# Patient Record
Sex: MALE | Race: WHITE | Employment: OTHER | URBAN - METROPOLITAN AREA
[De-identification: names, ages, dates, MRNs, and addresses within clinical notes are randomized per-mention and may not be internally consistent; named-entity substitution may affect disease eponyms.]

---

## 2022-11-21 ENCOUNTER — APPOINTMENT (OUTPATIENT)
Dept: GENERAL RADIOLOGY | Age: 77
DRG: 310 | End: 2022-11-21
Attending: EMERGENCY MEDICINE
Payer: MEDICARE

## 2022-11-21 ENCOUNTER — HOSPITAL ENCOUNTER (INPATIENT)
Age: 77
LOS: 1 days | Discharge: ANOTHER HOSPITAL | DRG: 310 | End: 2022-11-22
Attending: EMERGENCY MEDICINE | Admitting: INTERNAL MEDICINE
Payer: MEDICARE

## 2022-11-21 DIAGNOSIS — R55 SYNCOPE, UNSPECIFIED SYNCOPE TYPE: ICD-10-CM

## 2022-11-21 DIAGNOSIS — I45.9 HEART BLOCK: Primary | ICD-10-CM

## 2022-11-21 DIAGNOSIS — R00.1 SYMPTOMATIC BRADYCARDIA: ICD-10-CM

## 2022-11-21 LAB
ALBUMIN SERPL-MCNC: 3.6 G/DL (ref 3.5–5.2)
ALBUMIN/GLOB SERPL: 1.2 {RATIO} (ref 1.1–2.2)
ALP SERPL-CCNC: 100 U/L (ref 40–129)
ALT SERPL-CCNC: 27 U/L (ref 10–50)
ANION GAP SERPL CALC-SCNC: 15 MMOL/L (ref 5–15)
AST SERPL-CCNC: 30 U/L (ref 10–50)
BASOPHILS # BLD: 0 K/UL (ref 0–0.1)
BASOPHILS NFR BLD: 0 % (ref 0–1)
BILIRUB SERPL-MCNC: 0.7 MG/DL (ref 0.2–1)
BUN SERPL-MCNC: 24 MG/DL (ref 8–23)
BUN/CREAT SERPL: 26 (ref 12–20)
CALCIUM SERPL-MCNC: 8.9 MG/DL (ref 8.8–10.2)
CHLORIDE SERPL-SCNC: 97 MMOL/L (ref 98–107)
CO2 SERPL-SCNC: 22 MMOL/L (ref 22–29)
COMMENT, HOLDF: NORMAL
CREAT SERPL-MCNC: 0.93 MG/DL (ref 0.7–1.2)
DIFFERENTIAL METHOD BLD: ABNORMAL
EOSINOPHIL # BLD: 0 K/UL (ref 0–0.4)
EOSINOPHIL NFR BLD: 0 % (ref 0–7)
ERYTHROCYTE [DISTWIDTH] IN BLOOD BY AUTOMATED COUNT: 12.9 % (ref 11.5–14.5)
GLOBULIN SER CALC-MCNC: 3 G/DL (ref 2–4)
GLUCOSE SERPL-MCNC: 125 MG/DL (ref 65–100)
HCT VFR BLD AUTO: 45.8 % (ref 36.6–50.3)
HGB BLD-MCNC: 15.4 G/DL (ref 12.1–17)
IMM GRANULOCYTES # BLD AUTO: 0.1 K/UL (ref 0–0.04)
IMM GRANULOCYTES NFR BLD AUTO: 1 % (ref 0–0.5)
LYMPHOCYTES # BLD: 2 K/UL (ref 0.8–3.5)
LYMPHOCYTES NFR BLD: 17 % (ref 12–49)
MAGNESIUM SERPL-MCNC: 1.8 MG/DL (ref 1.6–2.4)
MCH RBC QN AUTO: 32.6 PG (ref 26–34)
MCHC RBC AUTO-ENTMCNC: 33.6 G/DL (ref 30–36.5)
MCV RBC AUTO: 97 FL (ref 80–99)
MONOCYTES # BLD: 0.8 K/UL (ref 0–1)
MONOCYTES NFR BLD: 7 % (ref 5–13)
NEUTS SEG # BLD: 9.1 K/UL (ref 1.8–8)
NEUTS SEG NFR BLD: 76 % (ref 32–75)
NRBC # BLD: 0 K/UL (ref 0–0.01)
NRBC BLD-RTO: 0 PER 100 WBC
PLATELET # BLD AUTO: 224 K/UL (ref 150–400)
PMV BLD AUTO: 10.3 FL (ref 8.9–12.9)
POTASSIUM SERPL-SCNC: 4.1 MMOL/L (ref 3.5–5.1)
PROT SERPL-MCNC: 6.6 G/DL (ref 6.4–8.3)
RBC # BLD AUTO: 4.72 M/UL (ref 4.1–5.7)
SAMPLES BEING HELD,HOLD: NORMAL
SODIUM SERPL-SCNC: 134 MMOL/L (ref 136–145)
TROPONIN I BLD-MCNC: <0.04 NG/ML (ref 0–0.08)
WBC # BLD AUTO: 12 K/UL (ref 4.1–11.1)

## 2022-11-21 PROCEDURE — 85025 COMPLETE CBC W/AUTO DIFF WBC: CPT

## 2022-11-21 PROCEDURE — 71045 X-RAY EXAM CHEST 1 VIEW: CPT

## 2022-11-21 PROCEDURE — 84443 ASSAY THYROID STIM HORMONE: CPT

## 2022-11-21 PROCEDURE — 65270000029 HC RM PRIVATE

## 2022-11-21 PROCEDURE — 74011250636 HC RX REV CODE- 250/636: Performed by: EMERGENCY MEDICINE

## 2022-11-21 PROCEDURE — 74011000250 HC RX REV CODE- 250: Performed by: EMERGENCY MEDICINE

## 2022-11-21 PROCEDURE — 36415 COLL VENOUS BLD VENIPUNCTURE: CPT

## 2022-11-21 PROCEDURE — 80053 COMPREHEN METABOLIC PANEL: CPT

## 2022-11-21 PROCEDURE — 99285 EMERGENCY DEPT VISIT HI MDM: CPT

## 2022-11-21 PROCEDURE — 93005 ELECTROCARDIOGRAM TRACING: CPT

## 2022-11-21 PROCEDURE — 83735 ASSAY OF MAGNESIUM: CPT

## 2022-11-21 RX ORDER — ACETAMINOPHEN 650 MG/1
650 SUPPOSITORY RECTAL
Status: DISCONTINUED | OUTPATIENT
Start: 2022-11-21 | End: 2022-11-22 | Stop reason: HOSPADM

## 2022-11-21 RX ORDER — SODIUM CHLORIDE 0.9 % (FLUSH) 0.9 %
5-40 SYRINGE (ML) INJECTION EVERY 8 HOURS
Status: DISCONTINUED | OUTPATIENT
Start: 2022-11-21 | End: 2022-11-22 | Stop reason: HOSPADM

## 2022-11-21 RX ORDER — SODIUM CHLORIDE 0.9 % (FLUSH) 0.9 %
5-40 SYRINGE (ML) INJECTION AS NEEDED
Status: DISCONTINUED | OUTPATIENT
Start: 2022-11-21 | End: 2022-11-22 | Stop reason: HOSPADM

## 2022-11-21 RX ORDER — ONDANSETRON 4 MG/1
4 TABLET, ORALLY DISINTEGRATING ORAL
Status: DISCONTINUED | OUTPATIENT
Start: 2022-11-21 | End: 2022-11-22 | Stop reason: HOSPADM

## 2022-11-21 RX ORDER — ACETAMINOPHEN 325 MG/1
650 TABLET ORAL
Status: DISCONTINUED | OUTPATIENT
Start: 2022-11-21 | End: 2022-11-22 | Stop reason: HOSPADM

## 2022-11-21 RX ORDER — ONDANSETRON 2 MG/ML
4 INJECTION INTRAMUSCULAR; INTRAVENOUS
Status: DISCONTINUED | OUTPATIENT
Start: 2022-11-21 | End: 2022-11-22 | Stop reason: HOSPADM

## 2022-11-21 RX ORDER — ENOXAPARIN SODIUM 100 MG/ML
40 INJECTION SUBCUTANEOUS DAILY
Status: DISCONTINUED | OUTPATIENT
Start: 2022-11-22 | End: 2022-11-22 | Stop reason: HOSPADM

## 2022-11-21 RX ORDER — POLYETHYLENE GLYCOL 3350 17 G/17G
17 POWDER, FOR SOLUTION ORAL DAILY PRN
Status: DISCONTINUED | OUTPATIENT
Start: 2022-11-21 | End: 2022-11-22 | Stop reason: HOSPADM

## 2022-11-21 RX ADMIN — SODIUM CHLORIDE 1000 ML: 9 INJECTION, SOLUTION INTRAVENOUS at 16:58

## 2022-11-21 RX ADMIN — SODIUM CHLORIDE, PRESERVATIVE FREE 10 ML: 5 INJECTION INTRAVENOUS at 16:58

## 2022-11-21 NOTE — ED NOTES
Patient converted from 2nd degree AV block to NSR with HR 74. Dr. William stewart at bedside. EKG repeated.

## 2022-11-21 NOTE — ED TRIAGE NOTES
Pt reports last night he started feeling a little bit lightheaded, says he thought it was due to dehydration and drank a bunch of water. Pt reports this morning he was at the grocery store when he felt some indigestion and then had a syncopal episode with brief LOC of a few seconds. Denies hitting his head. Says he hit his right rib area with his cane when he fell. denies taking any anticoagulants. Takes atenolol. Denies chest pain. Pt noted to be bradycardic in triage.

## 2022-11-21 NOTE — ED NOTES
TRANSFER - OUT REPORT:    Verbal report given to Teressa Petersen RN(name) on Leni Sánchez  being transferred to [facility] ED(unit) for routine progression of care       Report consisted of patients Situation, Background, Assessment and   Recommendations(SBAR). Information from the following report(s) SBAR, Kardex, ED Summary, [facility] and Recent Results was reviewed with the receiving nurse. Lines:   Peripheral IV 11/21/22 Right Antecubital (Active)       Peripheral IV 11/21/22 Left Antecubital (Active)   Site Assessment Clean, dry, & intact 11/21/22 1659   Phlebitis Assessment 0 11/21/22 1659   Infiltration Assessment 0 11/21/22 1659   Dressing Status Clean, dry, & intact 11/21/22 1659   Dressing Type Transparent 11/21/22 1659   Hub Color/Line Status Green 11/21/22 Raoul Woody 1420 for questions and clarification was provided.       Patient transported with:  [facility]

## 2022-11-21 NOTE — Clinical Note
Status[de-identified] INPATIENT [101]   Type of Bed: Intensive Care [6]   Inpatient Hospitalization Certified Necessary for the Following Reasons: 3.  Patient receiving treatment that can only be provided in an inpatient setting (further clarification in H&P documentation)   Admitting Diagnosis: Symptomatic bradycardia [1894848]   Admitting Physician: Deny Felix [[id number]]   Attending Physician: Deny Felix [[id number]]   Estimated Length of Stay: 2 Midnights   Discharge Plan[de-identified] Home with Office Follow-up

## 2022-11-21 NOTE — ED NOTES
Patient intermittently converting between NSR and 2nd degree AV Block. Patient's HR reaching upper 20's intermittently. Patient is alert and oriented x 4, denies chest pain. Dr. Mukul Ward notified of patient's HR. Per Dr. Mukul Ward patient not to be paced unless symptomatic.

## 2022-11-21 NOTE — ED PROVIDER NOTES
History of hypertension, irregular heartbeat. He presents with complaints of a syncopal episode. It occurred prior to arrival.  He states that he was in a grocery store, and the next thing he knew he ended up on the floor. He does not remember getting any warning signs. He states that he aroused before the people arrived to check on him. He has felt fine since then. He had an event last evening where he became lightheaded but did not faint. He has felt well lately. No recent illness. Past Medical History:   Diagnosis Date    Dizzy spells     Irregular heart beats        Past Surgical History:   Procedure Laterality Date    HX ORTHOPAEDIC  1959    Knee work          Family History:   Problem Relation Age of Onset    Stroke Mother        Social History     Socioeconomic History    Marital status: SINGLE     Spouse name: Not on file    Number of children: Not on file    Years of education: Not on file    Highest education level: Not on file   Occupational History    Not on file   Tobacco Use    Smoking status: Former    Smokeless tobacco: Not on file   Substance and Sexual Activity    Alcohol use: No     Alcohol/week: 0.0 standard drinks    Drug use: Not on file    Sexual activity: Not on file   Other Topics Concern    Not on file   Social History Narrative    Not on file     Social Determinants of Health     Financial Resource Strain: Not on file   Food Insecurity: Not on file   Transportation Needs: Not on file   Physical Activity: Not on file   Stress: Not on file   Social Connections: Not on file   Intimate Partner Violence: Not on file   Housing Stability: Not on file         ALLERGIES: Patient has no known allergies. Review of Systems   All other systems reviewed and are negative.     Vitals:    11/21/22 1648 11/21/22 1655   BP: (!) 127/53    Pulse: (!) 44    Resp: 12    Temp: 97.9 °F (36.6 °C)    SpO2: 96% 96%   Weight: 81.6 kg (180 lb)    Height: 6' 1\" (1.854 m)             Physical Exam  Vitals and nursing note reviewed. Constitutional:       Appearance: He is well-developed. HENT:      Head: Normocephalic and atraumatic. Eyes:      Conjunctiva/sclera: Conjunctivae normal.   Neck:      Trachea: No tracheal deviation. Cardiovascular:      Rate and Rhythm: Normal rate and regular rhythm. Heart sounds: Normal heart sounds. No murmur heard. No friction rub. No gallop. Pulmonary:      Effort: Pulmonary effort is normal.      Breath sounds: Normal breath sounds. Abdominal:      Palpations: Abdomen is soft. Tenderness: There is no abdominal tenderness. Musculoskeletal:         General: No deformity. Cervical back: Neck supple. Skin:     General: Skin is warm and dry. Neurological:      Mental Status: He is alert. Comments: oriented        MDM         Procedures    EKG: Normal sinus rhythm; second-degree heart block; rate of 42; normal STSanto MD  4:45 PM    EKG #2: Normal sinus rhythm; rate of 71; normal STSanto MD  5:05 PM    Consult note: I spoke with the cardiology NP. She agrees with transfer to [facility]. Arianna Galloway MD  5:33 PM    [facility] Consult for Admission  5:33 PM    ED Room Number: S72/C79  Patient Name and age:  Ashley Escobedo 65 y.o.  male  Working Diagnosis: 2:1 AV block/syncope    COVID-19 Suspicion:  no  Sepsis present:  no  Reassessment needed: Other  Code Status:  Full Code  Readmission: no  Isolation Requirements:  no  Recommended Level of Care:  ICU  Department: [facility]  Other: Presented after syncopal episode. Found to be in 2-1 AV block. I spoke with cardiology who agrees with the plan for transfer. He is intermittently in sinus rhythm drops down as low as into the 20s. Blood pressures have been stable. Consult note: I reached out to the hospitalist service via [facility] for admission.   Arianna Galloway MD  5:36 PM    Total critical care time (not including time spent performing separately reportable procedures): 35 minutes.   Sandrine Espinoza MD  5:36 PM

## 2022-11-22 ENCOUNTER — HOSPITAL ENCOUNTER (INPATIENT)
Age: 77
LOS: 4 days | Discharge: HOME | DRG: 229 | End: 2022-11-26
Attending: INTERNAL MEDICINE | Admitting: INTERNAL MEDICINE
Payer: MEDICARE

## 2022-11-22 ENCOUNTER — APPOINTMENT (OUTPATIENT)
Dept: NON INVASIVE DIAGNOSTICS | Age: 77
DRG: 310 | End: 2022-11-22
Attending: INTERNAL MEDICINE
Payer: MEDICARE

## 2022-11-22 VITALS
SYSTOLIC BLOOD PRESSURE: 121 MMHG | HEIGHT: 73 IN | BODY MASS INDEX: 23.86 KG/M2 | TEMPERATURE: 98.4 F | OXYGEN SATURATION: 91 % | HEART RATE: 44 BPM | WEIGHT: 180 LBS | RESPIRATION RATE: 13 BRPM | DIASTOLIC BLOOD PRESSURE: 56 MMHG

## 2022-11-22 DIAGNOSIS — I45.9 HEART BLOCK: ICD-10-CM

## 2022-11-22 DIAGNOSIS — I10 PRIMARY HYPERTENSION: Primary | ICD-10-CM

## 2022-11-22 DIAGNOSIS — A80.9 POLIO: ICD-10-CM

## 2022-11-22 DIAGNOSIS — I44.2 COMPLETE AV BLOCK (HCC): ICD-10-CM

## 2022-11-22 DIAGNOSIS — R00.1 SYMPTOMATIC BRADYCARDIA: ICD-10-CM

## 2022-11-22 DIAGNOSIS — R55 SYNCOPE AND COLLAPSE: ICD-10-CM

## 2022-11-22 DIAGNOSIS — Z95.0 S/P PLACEMENT OF LEADLESS CARDIAC PACEMAKER: ICD-10-CM

## 2022-11-22 DIAGNOSIS — I31.4 PERICARDIAL TAMPONADE: ICD-10-CM

## 2022-11-22 LAB
ECHO AO ASC DIAM: 3.1 CM
ECHO AO ASCENDING AORTA INDEX: 1.5 CM/M2
ECHO AR MAX VEL PISA: 2.8 M/S
ECHO AV AREA PEAK VELOCITY: 2.7 CM2
ECHO AV AREA VTI: 2.7 CM2
ECHO AV AREA/BSA PEAK VELOCITY: 1.3 CM2/M2
ECHO AV AREA/BSA VTI: 1.3 CM2/M2
ECHO AV MEAN GRADIENT: 13 MMHG
ECHO AV MEAN VELOCITY: 1.7 M/S
ECHO AV PEAK GRADIENT: 27 MMHG
ECHO AV PEAK VELOCITY: 2.6 M/S
ECHO AV REGURGITANT PHT: 859.9 MILLISECOND
ECHO AV VELOCITY RATIO: 0.85
ECHO AV VTI: 44.9 CM
ECHO LA DIAMETER INDEX: 1.89 CM/M2
ECHO LA DIAMETER: 3.9 CM
ECHO LA VOL 2C: 51 ML (ref 18–58)
ECHO LA VOL 4C: 57 ML (ref 18–58)
ECHO LA VOL BP: 55 ML (ref 18–58)
ECHO LA VOL/BSA BIPLANE: 27 ML/M2 (ref 16–34)
ECHO LA VOLUME AREA LENGTH: 57 ML
ECHO LA VOLUME INDEX A2C: 25 ML/M2 (ref 16–34)
ECHO LA VOLUME INDEX A4C: 28 ML/M2 (ref 16–34)
ECHO LA VOLUME INDEX AREA LENGTH: 28 ML/M2 (ref 16–34)
ECHO LV E' LATERAL VELOCITY: 11 CM/S
ECHO LV E' SEPTAL VELOCITY: 6 CM/S
ECHO LV FRACTIONAL SHORTENING: 45 % (ref 28–44)
ECHO LV INTERNAL DIMENSION DIASTOLE INDEX: 2.57 CM/M2
ECHO LV INTERNAL DIMENSION DIASTOLIC: 5.3 CM (ref 4.2–5.9)
ECHO LV INTERNAL DIMENSION SYSTOLIC INDEX: 1.41 CM/M2
ECHO LV INTERNAL DIMENSION SYSTOLIC: 2.9 CM
ECHO LV IVSD: 1 CM (ref 0.6–1)
ECHO LV MASS 2D: 200.4 G (ref 88–224)
ECHO LV MASS INDEX 2D: 97.3 G/M2 (ref 49–115)
ECHO LV POSTERIOR WALL DIASTOLIC: 1 CM (ref 0.6–1)
ECHO LV RELATIVE WALL THICKNESS RATIO: 0.38
ECHO LVOT AREA: 3.1 CM2
ECHO LVOT AV VTI INDEX: 0.85
ECHO LVOT DIAM: 2 CM
ECHO LVOT MEAN GRADIENT: 9 MMHG
ECHO LVOT PEAK GRADIENT: 20 MMHG
ECHO LVOT PEAK VELOCITY: 2.2 M/S
ECHO LVOT STROKE VOLUME INDEX: 58.2 ML/M2
ECHO LVOT SV: 119.9 ML
ECHO LVOT VTI: 38.2 CM
ECHO MV A VELOCITY: 1.45 M/S
ECHO MV E DECELERATION TIME (DT): 182.4 MS
ECHO MV E VELOCITY: 1.08 M/S
ECHO MV E/A RATIO: 0.74
ECHO MV E/E' LATERAL: 9.82
ECHO MV E/E' RATIO (AVERAGED): 13.91
ECHO MV E/E' SEPTAL: 18
ECHO MV REGURGITANT PEAK GRADIENT: 81 MMHG
ECHO MV REGURGITANT PEAK VELOCITY: 4.5 M/S
ECHO PV MAX VELOCITY: 1.6 M/S
ECHO PV PEAK GRADIENT: 10 MMHG
ECHO RV INTERNAL DIMENSION: 4 CM
ECHO RV TAPSE: 2.9 CM (ref 1.7–?)
ECHO RVOT PEAK GRADIENT: 4 MMHG
ECHO RVOT PEAK VELOCITY: 1 M/S
ECHO TV REGURGITANT MAX VELOCITY: 3.33 M/S
ECHO TV REGURGITANT PEAK GRADIENT: 44 MMHG
TSH SERPL DL<=0.05 MIU/L-ACNC: 2.98 UIU/ML (ref 0.27–4.2)

## 2022-11-22 PROCEDURE — 65620000000 HC RM CCU GENERAL

## 2022-11-22 PROCEDURE — 74011000250 HC RX REV CODE- 250: Performed by: INTERNAL MEDICINE

## 2022-11-22 PROCEDURE — 99223 1ST HOSP IP/OBS HIGH 75: CPT | Performed by: INTERNAL MEDICINE

## 2022-11-22 PROCEDURE — 74011250636 HC RX REV CODE- 250/636: Performed by: INTERNAL MEDICINE

## 2022-11-22 PROCEDURE — 74011000250 HC RX REV CODE- 250: Performed by: EMERGENCY MEDICINE

## 2022-11-22 PROCEDURE — APPSS30 APP SPLIT SHARED TIME 16-30 MINUTES: Performed by: NURSE PRACTITIONER

## 2022-11-22 PROCEDURE — 93306 TTE W/DOPPLER COMPLETE: CPT | Performed by: INTERNAL MEDICINE

## 2022-11-22 PROCEDURE — 93306 TTE W/DOPPLER COMPLETE: CPT

## 2022-11-22 PROCEDURE — 93005 ELECTROCARDIOGRAM TRACING: CPT

## 2022-11-22 RX ORDER — DOPAMINE HYDROCHLORIDE 320 MG/100ML
0-20 INJECTION, SOLUTION INTRAVENOUS
Status: DISCONTINUED | OUTPATIENT
Start: 2022-11-22 | End: 2022-11-22 | Stop reason: HOSPADM

## 2022-11-22 RX ORDER — DOPAMINE HYDROCHLORIDE 320 MG/100ML
0-20 INJECTION, SOLUTION INTRAVENOUS
Status: DISCONTINUED | OUTPATIENT
Start: 2022-11-22 | End: 2022-11-25

## 2022-11-22 RX ORDER — ENOXAPARIN SODIUM 100 MG/ML
40 INJECTION SUBCUTANEOUS DAILY
Status: DISCONTINUED | OUTPATIENT
Start: 2022-11-23 | End: 2022-11-23

## 2022-11-22 RX ORDER — ONDANSETRON 4 MG/1
4 TABLET, ORALLY DISINTEGRATING ORAL
Status: DISCONTINUED | OUTPATIENT
Start: 2022-11-22 | End: 2022-11-26 | Stop reason: HOSPADM

## 2022-11-22 RX ORDER — SODIUM CHLORIDE, SODIUM LACTATE, POTASSIUM CHLORIDE, CALCIUM CHLORIDE 600; 310; 30; 20 MG/100ML; MG/100ML; MG/100ML; MG/100ML
100 INJECTION, SOLUTION INTRAVENOUS CONTINUOUS
Status: DISCONTINUED | OUTPATIENT
Start: 2022-11-23 | End: 2022-11-25

## 2022-11-22 RX ORDER — SODIUM CHLORIDE 0.9 % (FLUSH) 0.9 %
5-40 SYRINGE (ML) INJECTION AS NEEDED
Status: DISCONTINUED | OUTPATIENT
Start: 2022-11-22 | End: 2022-11-26 | Stop reason: HOSPADM

## 2022-11-22 RX ORDER — ATENOLOL 50 MG/1
75 TABLET ORAL DAILY
COMMUNITY

## 2022-11-22 RX ORDER — ACETAMINOPHEN 650 MG/1
650 SUPPOSITORY RECTAL
Status: DISCONTINUED | OUTPATIENT
Start: 2022-11-22 | End: 2022-11-26 | Stop reason: HOSPADM

## 2022-11-22 RX ORDER — ATROPINE SULFATE 0.1 MG/ML
1 INJECTION INTRAVENOUS ONCE
Status: COMPLETED | OUTPATIENT
Start: 2022-11-22 | End: 2022-11-22

## 2022-11-22 RX ORDER — ACETAMINOPHEN 325 MG/1
650 TABLET ORAL
Status: DISCONTINUED | OUTPATIENT
Start: 2022-11-22 | End: 2022-11-26 | Stop reason: HOSPADM

## 2022-11-22 RX ORDER — DOPAMINE HYDROCHLORIDE 320 MG/100ML
INJECTION, SOLUTION INTRAVENOUS
Status: DISCONTINUED
Start: 2022-11-22 | End: 2022-11-22 | Stop reason: HOSPADM

## 2022-11-22 RX ORDER — PROCHLORPERAZINE EDISYLATE 5 MG/ML
10 INJECTION INTRAMUSCULAR; INTRAVENOUS
Status: DISCONTINUED | OUTPATIENT
Start: 2022-11-22 | End: 2022-11-22 | Stop reason: HOSPADM

## 2022-11-22 RX ORDER — POLYETHYLENE GLYCOL 3350 17 G/17G
17 POWDER, FOR SOLUTION ORAL DAILY PRN
Status: DISCONTINUED | OUTPATIENT
Start: 2022-11-22 | End: 2022-11-26 | Stop reason: HOSPADM

## 2022-11-22 RX ORDER — SODIUM CHLORIDE 0.9 % (FLUSH) 0.9 %
5-40 SYRINGE (ML) INJECTION EVERY 8 HOURS
Status: DISCONTINUED | OUTPATIENT
Start: 2022-11-22 | End: 2022-11-26 | Stop reason: HOSPADM

## 2022-11-22 RX ORDER — ONDANSETRON 2 MG/ML
4 INJECTION INTRAMUSCULAR; INTRAVENOUS
Status: DISCONTINUED | OUTPATIENT
Start: 2022-11-22 | End: 2022-11-26 | Stop reason: HOSPADM

## 2022-11-22 RX ADMIN — SODIUM CHLORIDE, PRESERVATIVE FREE 10 ML: 5 INJECTION INTRAVENOUS at 21:12

## 2022-11-22 RX ADMIN — PROCHLORPERAZINE EDISYLATE 10 MG: 5 INJECTION INTRAMUSCULAR; INTRAVENOUS at 04:45

## 2022-11-22 RX ADMIN — ONDANSETRON 4 MG: 2 INJECTION INTRAMUSCULAR; INTRAVENOUS at 03:02

## 2022-11-22 RX ADMIN — DOPAMINE HYDROCHLORIDE IN DEXTROSE 5 MCG/KG/MIN: 3.2 INJECTION, SOLUTION INTRAVENOUS at 02:45

## 2022-11-22 RX ADMIN — DOPAMINE HYDROCHLORIDE IN DEXTROSE 16 MCG/KG/MIN: 3.2 INJECTION, SOLUTION INTRAVENOUS at 15:02

## 2022-11-22 RX ADMIN — ATROPINE SULFATE 1 MG: 0.1 INJECTION, SOLUTION INTRAVENOUS at 02:32

## 2022-11-22 RX ADMIN — SODIUM CHLORIDE, PRESERVATIVE FREE 10 ML: 5 INJECTION INTRAVENOUS at 15:03

## 2022-11-22 RX ADMIN — SODIUM CHLORIDE, PRESERVATIVE FREE 10 ML: 5 INJECTION INTRAVENOUS at 07:30

## 2022-11-22 RX ADMIN — ATROPINE SULFATE 1 MG: 0.1 INJECTION, SOLUTION INTRAVENOUS at 02:25

## 2022-11-22 NOTE — ROUTINE PROCESS
14:00 Emergent admission from [facility] for perm pacer today or tomorrow. Cardiology consulted. Primary Nurse Jermain Palafox, CANDICE and Edel, RN performed a dual skin assessment on this patient No impairment noted  Current Bed: RUBIA Tijerina  Binh score is 17    16:30 Dr. Rosales Magallanes at bedside spoke with patient, states he is on schedule for perm pacemaker tomorrow with Dr. Odette Garcia. NPO after MN    19:30 Bedside shift change report given to Diane Valle (oncoming nurse) by MOM (offgoing nurse). Report included the following information SBAR, Kardex, Procedure Summary, Intake/Output, MAR, Accordion, Recent Results, Med Rec Status, Cardiac Rhythm 3rd degree HB, Alarm Parameters , Pre Procedure Checklist, Procedure Verification, and Quality Measures.

## 2022-11-22 NOTE — PROGRESS NOTES
[id number] called [facility] to get transfer to [facility] started,. Asked to have NAIMA ALSTON to call Dr Concepcion Mcdaniel to discuss transfer. [id number] spoke with [facility], Dr Pascale Corbin in CVICU at [facility] has agreed to accept patient once bed is available.  Requesting that sending cardiologist speak with accepting cardiologist team at [facility]

## 2022-11-22 NOTE — H&P
Hospitalist Admission Note    NAME:  Leni Sánchez   :  1945   MRN:  [id number]     Date/Time:  2022 11:46 PM    Patient PCP: Jose Cochran MD  ________________________________________________________________________    Given the patient's current clinical presentation, I have a high level of concern for decompensation if discharged from the emergency department. Complex decision making was performed, which includes reviewing the patient's available past medical records, laboratory results, and x-ray films. My assessment of this patient's clinical condition and my plan of care is as follows. Assessment / Plan:    Syncope: The patient comes in w/ transient episode of syncope concerning for 2nd degree heart block. VS - HR 65  WBC 12, Hg 15.4  BUN 24, Cr 0.93  EKG w/ 2nd degree AV block    Admit and cont to monitor  Hold Atenolol 75mg daily  Echo, Duplex, orthostatic vitals  Consult cardiology    Update 201  Notified by nursing that the patient lifted his head in bed and began to get dizzy    EKG now reports of complete heart block  Transfer to ICU    I have administered two rounds of 1mg Atropine to assist w/ his bradycardia without any improvement. He is hemodynamically stable and asymptomatic at this time. I have discussed the case w/ Dr. Live Ocampo and he will be transferred to [facility]. 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 Code Status: Full Code   Surrogate Decision Maker  Penasco Balloon (sister)  Ph unk    Prophylaxis:  Lovenox SQ    I have spent 60 minutes in critical care evaluation and treatment of this patient w/ progressing heart block.      Subjective:   CHIEF COMPLAINT: I passed out    HISTORY OF PRESENT ILLNESS:       The patient is a 64 y/o C M w/ PMH paraplysis due to polio who comes in w/ brief syncopal episode which occurred while he was in the grocery store. This only lasted for a few seconds without any known injury. He denies any fever, chills or night sweats. He has no chest pain, palpitations, coughing, wheezing or dyspnea. He has no abdominal pain, nausea, vomiting or diarrhea. Past Medical History:   Diagnosis Date    Dizzy spells     Irregular heart beats       Past Surgical History:   Procedure Laterality Date    HX ORTHOPAEDIC  1959    Knee work      Social History     Tobacco Use    Smoking status: Former    Smokeless tobacco: Not on file   Substance Use Topics    Alcohol use: No     Alcohol/week: 0.0 standard drinks      Family History   Problem Relation Age of Onset    Stroke Mother         No Known Allergies     Prior to Admission medications    Medication Sig Start Date End Date Taking? Authorizing Provider   atenolol (TENORMIN) 50 mg tablet Take 75 mg by mouth daily.     Provider, Historical       REVIEW OF SYSTEMS:  See HPI for details  General: negative for fever, chills, sweats, weakness, weight loss  Eyes: negative for blurred vision, eye pain, loss of vision, diplopia  Ear Nose and Throat: negative for rhinorrhea, pharyngitis, otalgia, tinnitus, speech or swallowing difficulties  Respiratory:  negative for pleuritic pain, cough, sputum production, wheezing, SOB, SANTILLAN  Cardiology:  negative for chest pain, palpitations, orthopnea, PND, edema, syncope   Gastrointestinal: negative for abdominal pain, N/V, dysphagia, change in bowel habits, bleeding  Genitourinary: negative for frequency, urgency, dysuria, hematuria, incontinence  Muskuloskeletal : negative for arthralgia, myalgia  Hematology: negative for easy bruising, bleeding, lymphadenopathy  Dermatological: negative for rash, ulceration, mole change, new lesion  Endocrine: negative for hot flashes or polydipsia  Neurological: negative for headache, dizziness, confusion, focal weakness, paresthesia, memory loss, gait disturbance  Psychological: negative for anxiety, depression, agitation      Objective:   VITALS:    Visit Vitals  BP (!) 141/60   Pulse 65   Temp 97.9 °F (36.6 °C)   Resp 18   Ht 6' 1\" (1.854 m)   Wt 81.6 kg (180 lb)   SpO2 96%   BMI 23.75 kg/m²     PHYSICAL EXAM:    Physical Exam:    Gen: Well-developed, well-nourished, in no acute distress  HEENT:  Pink conjunctivae, PERRL, hearing intact to voice, moist mucous membranes  Neck: Supple, without masses, thyroid non-tender  Resp: No accessory muscle use, clear breath sounds without wheezes rales or rhonchi  Card: No murmurs, normal S1, S2 without thrills, bruits or peripheral edema  Abd:  Soft, non-tender, non-distended, normoactive bowel sounds are present, no palpable organomegaly and no detectable hernias  Lymph:  No cervical or inguinal adenopathy  Musc: No cyanosis or clubbing  Skin: No rashes or ulcers, skin turgor is good  Neuro:  Cranial nerves are grossly intact, no focal motor weakness, follows commands appropriately  Psych:  Good insight, oriented to person, place and time, alert  _______________________________________________________________________  Care Plan discussed with:  Pt's condition, Imaging findings, Lab findings, Assessment, and Care Plan discussed with: Patient  _______________________________________________________________________    Probable disposition:  Home  ________________________________________________________________________    Comments   >50% of visit spent in counseling and coordination of care  Chart reviewed  Discussion with patient and/or family and questions answered     ________________________________________________________________________  Signed: Sherry Johnson MD        Procedures: see electronic medical records for all procedures/Xrays and details which were not copied into this note but were reviewed prior to creation of Plan.     LAB DATA REVIEWED:

## 2022-11-22 NOTE — PROGRESS NOTES
I was asked to comment on patients need for transfer to another facility, specifically their ED. I reviewed documents. Per cardiology note, patient is in need of a pacemaker and said service not available at [facility] until Wednesday - this would introduce a delay in care. As such, they recommended transfer to [facility] where procedure would be done today. Unfortunately there are no ICU beds at the receiving hiospital.    He cannot go from [facility] ICU straight to procedure at [facility] without first being admitted/processed at their facility. As the patient is asymptomatic at this time and his only ICU need is said pacemaker, and as transfer is the quickest way to obtain the recommended treatment, it is reasonable and pragmatic to transfer pt to [facility] ED. The anticipated course would be transfer to ED, from there he can be processed/admitted, receive PPM, and then be downgraded. So long as patient/family amenable with plan, I would proceed with transfer.

## 2022-11-22 NOTE — CONSULTS
[facility] CARDIOLOGY    MINH Addendum    I have seen, interviewed, and examined the patient. I agree with the history, exam, and was involved in the formulation of the assessment and plan as detailed in the Cardiology/Cardiac Electrophysiology consultation documentation composed today by the Advance Practice Provider (MINH, NP, PA). Please see the MINHs note for full details, below includes any additional revisions. I have performed the exam and medical decision making portions in its entirety. Physical exam:  Visit Vitals  BP (!) 147/47   Pulse (!) 47   Temp 98.7 °F (37.1 °C)   Resp 27   Wt 186 lb 8.2 oz (84.6 kg)   SpO2 92%   BMI 24.61 kg/m²       GENERAL: No acute distress  HEAD: AT/NC  HEENT: Sclerae anicteric, ocular muscles intact. CARDIOVASCULAR: bradycardic, nl S1/S2, no murmurs/rubs/gallops  RESPIRATORY: clear to auscultation bilaterally, without wheezes/rales/rhonchi  ABDOMINAL: soft, non-tender, non-distended, positive bowel sounds  EXTREMITIES: warm, well perfused, no lower extremities edema, no cyanosis. NEURO: alert, oriented, answering questions appropriately, no focal neurologic deficits  PSY: normal mood    Data: Labs, ECG, telemetry personally reviewed and interpreted    Current active problems:   1. intermittent complete heart block and 2-1 AV block  2. Syncope  3. Hypertension  4. Post polio syndrome    Assessment and Plan:   Cardiac electrophysiology was consulted regarding the management of syncope and evidence of heart block. Patient presented to [facility] after syncopal episode. He was found to have second-degree type II AV block. I independently reviewed multiple EKGs as well as his rhythm upon arrival to [facility] and he has evidence of intermittent high-grade AV block as well as complete heart block. Currently he is in 2-1 AV block. He is on atenolol 75 mg daily in which his last dose was taken on 11/21/2020 2 in the morning.   However he remains to be bradycardic with 2-1 AV block. Patient remains hemodynamically stable. - TSH within normal limits  - Echocardiogram today demonstrated LVEF of 55 to 60%, moderate aortic sclerosis with mild AI, mild TR  - He remains hemodynamically stable on dopamine drip  - Would recommend continue holding atenolol. However given the lack of improvement in his rhythm, I am concerned that he will likely need pacemaker implantation.  - In the setting of persistent 2-1 heart block and evidence of high-grade AV block and syncope, patient ultimately would benefit from pacemaker implantation  - Spoke to him regarding the implications of dual-chamber pacemaker versus Micra AV leadless pacemaker. Because he has postpolio syndrome and requires significant support with his upper extremities, he is concerned about the potential risks of lead dislodgment with a traditional transvenous pacemaker. He is a candidate for Micra AV leadless pacemaker. We will have Dr. Carolina Mccarthy speak with him in further details should he require permanent pacemaker implantation tomorrow. - We will make him n.p.o. after midnight for possible dual-chamber pacemaker versus leadless pacemaker implantation tomorrow with Dr. Carolina Mccarthy      Thank you so much for the consultation. [facility] EP will continue to follow along. Please don't hesitate to contact us with any questions or referrals.         [x]    High complexity decision making was performed  [x]    Patient is at high-risk of decompensation with multiple organ involvement      ___________________________    An Sanjay Meyers MD, [facility]  Director, Electrophysiology, [facility] and [address] [place], [place] [address] [phone]   C/ Jaswant Finnegan 81  [address]  Kaden Garcia 296                                                                 [facility]                       Electrophysiology Note     [x]Initial Encounter     []Follow-up    Patient Name: Ashley Escobedo - :1945 - CKM:[id number]  Primary Cardiologist: [facility] Cardiology Physicians: Linette Rubio MD  Consulting Cardiologist: [facility] Cardiology Physicians: Vandana Armenta MD     Reason for encounter: syncope; second degree type 2 heart block    HPI:       Ashley Escobedo is a 65 y.o. male with PMH significant for HTN and post-polio syndrome. On  he was in the grocery store shopping when he had a syncopal episode. He was taken to [facility] where he was found to have second degree type 2 heart block. His home atenolol 75 mg daily which he takes for HTN has been held (last dose 22 AM). Labs were unremarkable. Echo today showed EF 55-60%, moderate aortic sclerosis with mild AI, and mild TR. He is currently on a dobutamine gtt. He has been transferred to [facility] for EP services and possible pacemaker. Subjective:      Ashley Escobedo reports syncope yesterday. Prior syncopal episode several years ago. Assessment and Plan     Second degree type 2 heart block. - Likely cause of syncope 22.  - Continue to hold atenolol and all AV raven blocking agents. - Echo 22: EF 55-60%, moderate aortic sclerosis with mild AI, and mild TR.   - Allow beta blocker to wash out.   - Plan for pacemaker Wednesday with Dr. Anai Johansen. Given post-polio syndrome and the necessity of using crutches will consider Micra AV leadless pacemaker as an alternate to traditional pacemaker. NPO after midnight. Hypertension.   - Remain off atenolol. Post-polio syndrome.   - LE weakness. He uses canes to walk.        ____________________________________________________________    Cardiac testing  22    ECHO ADULT COMPLETE 2022    Interpretation Summary    Left Ventricle: Normal left ventricular systolic function with a visually estimated EF of 55 - 60%.  Left ventricle size is normal. Normal wall thickness. Normal wall motion. Grade I diastolic dysfunction with normal LAP. Aortic Valve: Moderately calcified cusp. Moderate sclerosis of the aortic valve cusp. Mild regurgitation. Mild stenosis of the aortic valve. Mitral Valve: Valve structure is normal. Mild annular calcification of the mitral valve. Pulmonary Arteries: Mild pulmonary hypertension present. Signed by: Cecy Fitch MD on 11/22/2022  1:15 PM              Most recent HS troponins:  No results for input(s): TROPHS in the last 72 hours. No lab exists for component:  CKMB    ECG: second degree type 2 heart block    Review of Systems:    [x]All other systems reviewed and all negative except as written in HPI    [] Patient unable to provide secondary to condition    Past Medical History:   Diagnosis Date    Dizzy spells     Irregular heart beats      Past Surgical History:   Procedure Laterality Date    HX ORTHOPAEDIC  1959    Knee work      Social Hx:  reports that he has quit smoking. He does not have any smokeless tobacco history on file. He reports that he does not drink alcohol. Family Hx: family history includes Stroke in his mother. No Known Allergies       OBJECTIVE:  Wt Readings from Last 3 Encounters:   11/22/22 84.6 kg (186 lb 8.2 oz)   11/22/22 81.6 kg (180 lb)   01/08/16 92.1 kg (203 lb)       Intake/Output Summary (Last 24 hours) at 11/22/2022 1502  Last data filed at 11/22/2022 1413  Gross per 24 hour   Intake --   Output 125 ml   Net -125 ml       Physical Exam:    Vitals:   Vitals:    11/22/22 1410 11/22/22 1415 11/22/22 1430 11/22/22 1445   BP:       Pulse:  (!) 42 (!) 43 (!) 46   Resp:  14 16 16   Temp:       SpO2:  91% 92% 93%   Weight: 84.6 kg (186 lb 8.2 oz)        Telemetry: 2:1 AV block    Gen: No acute distress.    Neck: Supple, No JVD, No Carotid Bruit  Resp: No accessory muscle use, Clear breath sounds, No rales or rhonchi  Card: Rate in 50s, Normal S1, S2, 2/6 systolic murmur  Abd:   Soft, non-tender, non-distended, BS+   MSK: Alonzo LE weakness and deformity 2/2 polio. Alonzo UE without abnormalities. Skin: No rashes    Neuro: Alert, oriented. Psych: Good insight, oriented to person, place, alert, Nml Affect  LE: Alonzo trace pretibial edema    Data Review:     Radiology:   XR Results (most recent):  Results from Hospital Encounter encounter on 11/21/22    XR CHEST PORT    Narrative  INDICATION:  Syncope    COMPARISON: None    FINDINGS: Single AP portable view of the chest obtained at 1726 demonstrates an  enlarged cardiac silhouette. The lungs are clear bilaterally. No osseous  abnormalities are seen. Impression  Cardiomegaly. No focal pulmonary process. Recent Labs     11/21/22  1653   *   K 4.1   CL 97*   CO2 22   BUN 24*   CREA 0.93   *   CA 8.9     Recent Labs     11/21/22  1653   WBC 12.0*   HGB 15.4   HCT 45.8        Recent Labs     11/21/22  1653        No results for input(s): CHOL, LDLC in the last 72 hours.     No lab exists for component: TGL, HDLC,  HBA1C      Current meds:    Current Facility-Administered Medications:     sodium chloride (NS) flush 5-40 mL, 5-40 mL, IntraVENous, Q8H, Damion Brown MD    sodium chloride (NS) flush 5-40 mL, 5-40 mL, IntraVENous, PRN, Damion Brown MD    acetaminophen (TYLENOL) tablet 650 mg, 650 mg, Oral, Q6H PRN **OR** acetaminophen (TYLENOL) suppository 650 mg, 650 mg, Rectal, Q6H PRN, Damion Brown MD    polyethylene glycol (MIRALAX) packet 17 g, 17 g, Oral, DAILY PRN, Damion Brown MD    ondansetron (ZOFRAN ODT) tablet 4 mg, 4 mg, Oral, Q8H PRN **OR** ondansetron (ZOFRAN) injection 4 mg, 4 mg, IntraVENous, Q6H PRN, Quinton Kingdom, MD Dennard Gosselin ON 11/23/2022] enoxaparin (LOVENOX) injection 40 mg, 40 mg, SubCUTAneous, DAILY, Grant Stevenson MD    DOPamine (INTROPIN) 800 mg in dextrose 5% 250 mL infusion, 0-20 mcg/kg/min, IntraVENous, TITRATE, Quinton Kingdom, MD Ellender Osler, NP    [facility] Cardiology  Call center: (P) [phone] (f) [phone]      CC: Other, MD Jose

## 2022-11-22 NOTE — Clinical Note
TRANSFER - OUT REPORT:     Verbal report given to: (at bedside). Report consisted of patient's Situation, Background, Assessment and   Recommendations(SBAR). Opportunity for questions and clarification was provided. Patient transported with a Registered Nurse. Patient transported to: CCU.

## 2022-11-22 NOTE — PROGRESS NOTES
Called for bradycardia, lightheadedness. Syncope earlier in day. EKG 2:1 AVB. Currently asymptomatic, but was lightheaded with positional shift. Spoke with Hospitalist, nursing supervisor - advised transfer to [facility] for PPM as no EP physician until Wednesday at [facility]. Hold BB. Echo pending.

## 2022-11-22 NOTE — ED NOTES
Bedside and Verbal shift change report given to [address] [place] [address] (oncoming nurse) by Tonia Ayala (offgoing nurse). Report included the following information Kardex, ED Summary, MAR, and Recent Results.

## 2022-11-22 NOTE — DISCHARGE SUMMARY
Discharge Summary       PATIENT ID: Rigoberto Delgadillo  MRN: [id number]   YOB: 1945    DATE OF ADMISSION: 11/21/2022  4:29 PM    DATE OF DISCHARGE: 11/22/2023  PRIMARY CARE PROVIDER: Jazmine Baig MD       DISCHARGING PHYSICIAN: Celeste Pinto MD    To contact this individual call [phone] and ask the  to page. If unavailable ask to be transferred the Adult Hospitalist Department. 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 This only lasted for a few seconds without any known injury. He denies any fever, chills or night sweats. He has no chest pain, palpitations, coughing, wheezing or dyspnea. He has no abdominal pain, nausea, vomiting or diarrhea. He was foud to be in 2nd degree heart block which converted to complete heart block. He is currently on dopamine gtt and will be transferred to [facility] for PPM.      DISCHARGE DIAGNOSES / PLAN:       As above       PENDING TEST RESULTS:   At the time of discharge the following test results are still pending:     FOLLOW UP APPOINTMENTS:    Follow-up Information       Follow up With Specialties Details Why Contact Jose Lucia MD Neurology                ADDITIONAL CARE RECOMMENDATIONS:     DIET: Regular Diet    ACTIVITY: Activity as tolerated    WOUND CARE:     EQUIPMENT needed:       DISCHARGE MEDICATIONS:  Current Discharge Medication List        STOP taking these medications       atenolol (TENORMIN) 50 mg tablet Comments:   Reason for Stopping:                 NOTIFY YOUR PHYSICIAN FOR ANY OF THE FOLLOWING:   Fever over 101 degrees for 24 hours. Chest pain, shortness of breath, fever, chills, nausea, vomiting, diarrhea, change in mentation, falling, weakness, bleeding.  Severe pain or pain not relieved by medications. Or, any other signs or symptoms that you may have questions about. DISPOSITION:  x  Home With:   OT  PT  HH  RN       Long term SNF/Inpatient Rehab    Independent/assisted living    Hospice    Other:       PATIENT CONDITION AT DISCHARGE:     Functional status    Poor     Deconditioned    x Independent      Cognition   x  Lucid     Forgetful     Dementia      Catheters/lines (plus indication)    Tompkins     PICC     PEG     None      Code status    x Full code     DNR      PHYSICAL EXAMINATION AT DISCHARGE:   Refer to Progress Note    Gen:    Well-developed, well-nourished, in no acute distress  HEENT:  Pink conjunctivae, PERRL, hearing intact to voice, moist mucous membranes  Neck:  Supple, without masses, thyroid non-tender  Resp:  No accessory muscle use, clear breath sounds without wheezes rales or rhonchi  Card:   No murmurs, normal S1, S2 without thrills, bruits or peripheral edema.  bradycardia  Abd:  Soft, non-tender, non-distended, normoactive bowel sounds are present, no palpable organomegaly and no detectable hernias  Lymph:  No cervical or inguinal adenopathy  Musc:  No cyanosis or clubbing  Skin:   No rashes or ulcers, skin turgor is good  Neuro:  Cranial nerves are grossly intact, no focal motor weakness, follows commands appropriately  Psych:  Good insight, oriented to person, place and time, alert    CHRONIC MEDICAL DIAGNOSES:  Problem List as of 11/22/2022 Date Reviewed: 1/8/2016            Codes Class Noted - Resolved    Symptomatic bradycardia ICD-10-CM: R00.1  ICD-9-CM: 427.89  11/21/2022 - Present        Syncope ICD-10-CM: R55  ICD-9-CM: 780.2  11/21/2022 - Present        H/O atrial tachycardia ICD-10-CM: Z86.79  ICD-9-CM: V12.59  10/31/2014 - Present        HTN (hypertension) ICD-10-CM: I10  ICD-9-CM: 401.9  10/17/2014 - Present        Polio ICD-10-CM: A80.9  ICD-9-CM: 045.90  10/17/2014 - Present           Greater than 30 minutes were spent with the patient on counseling and coordination of care    Signed:   Kenyetta Woody MD  11/22/2022  11:34 AM

## 2022-11-22 NOTE — ED NOTES
[facility] called for update and pushed back ETA to 2030. Representative informed me that they received miscommuniation of need for lights and sirens call. ETA remains 2030.

## 2022-11-22 NOTE — PROGRESS NOTES
[facility]                       Cardiology Care Note     [x]Initial Encounter     []Follow-up    Patient Name: Nino Kline - :1945 - BGW:[id number]  Primary Cardiologist: [facility] Cardiology Physicians: Marc Simon MD  Consulting Cardiologist: [facility] Cardiology Physicians: Marc Simon MD     Reason for encounter: heart block     HPI:       Nino Kline is a 65 y.o. male with PMH significant for atrial tach, HTN, polio who presented w/ brief syncopal episode which occurred while he was in the grocery store. This only lasted for a few seconds without any known injury. He denies any fever, chills or night sweats. He has no chest pain, palpitations, coughing, wheezing or dyspnea. He has no abdominal pain, nausea, vomiting or diarrhea. Telemetry notes high grade AV block . Pt placed on dopamine. HR now 72. No syncope this am .     Subjective:      Nino Kline reports near-syncope. Assessment and Plan     1 high grade AV block (CHB Type 2 second degree). Cont dopamine. Plans are for transfer to [facility] for pacemaker insertion. Check TSH, ECHO. Hold AV raven agents. 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 Pt personally seen and examined. Chart reviewed. Agree with advanced NP's history, exam and  A/P with changes/additons. Vital signs reviewed/telemetry reviewed       Alert/Not in acute distress  Neck - no JVD  CVS - S1 S2 +; irregular; No murmur  RS : CTAB; No rales/rhonchi  Abd: Soft/BS+  LE - no edema    A/P:    History of syncope/dizziness/bradycardia/intermittent complete heart block-overnight on-call cardiologist-Dr. Yocasta Roa has discussed with electrophysiologist and patient is scheduled to be transferred to [facility] for permanent pacemaker. Beta-blockers on hold. Discussed with patient. He agrees to the transfer.     Discussed with patient/nursing    [facility] Jimmy Shannon MD, [facility] [place]      ____________________________________________________________    Cardiac testing  ECHO 2014 preserved LV function             Most recent HS troponins:  No results for input(s): TROPHS in the last 72 hours.     No lab exists for component:  CKMB    ECG:   EKG Results       Procedure 720 Value Units Date/Time    EKG, 12 LEAD, INITIAL [[id number]] Collected: 11/22/22 0439    Order Status: Completed Updated: 11/22/22 0439     Ventricular Rate 88 BPM      Atrial Rate 77 BPM      QRS Duration 130 ms      Q-T Interval 466 ms      QTC Calculation (Bezet) 563 ms      Calculated P Axis 31 degrees      Calculated R Axis -17 degrees      Calculated T Axis 124 degrees      Diagnosis --     Critical Test Result: Long QTc , AV Block  Sinus rhythm with AV dissociation and Wide QRS rhythm with frequent premature   ventricular complexes  Left ventricular hypertrophy with QRS widening and repolarization abnormality   ( R in aVL , [place] product )  Inferior infarct , age undetermined  Abnormal ECG  When compared with ECG of 22-NOV-2022 01:51,  MANUAL COMPARISON REQUIRED, DATA IS UNCONFIRMED      EKG, 12 LEAD, INITIAL [[id number]] Collected: 11/21/22 1642    Order Status: Completed Updated: 11/21/22 1643     Ventricular Rate 42 BPM      Atrial Rate 84 BPM      P-R Interval 236 ms      QRS Duration 80 ms      Q-T Interval 462 ms      QTC Calculation (Bezet) 385 ms      Calculated P Axis 27 degrees      Calculated R Axis -2 degrees      Calculated T Axis 35 degrees      Diagnosis --      Critical Test Result: AV Block  Sinus rhythm with 2nd degree AV block with 2:1 AV conduction  Inferior infarct , age undetermined  Abnormal ECG  No previous ECGs available                Review of Systems:    [x]All other systems reviewed and all negative except as written in HPI    [] Patient unable to provide secondary to condition    Past Medical History:   Diagnosis Date    Dizzy spells     Irregular heart beats      Past Surgical History:   Procedure Laterality Date    HX ORTHOPAEDIC  1959    Knee work      Social Hx:  reports that he has quit smoking. He does not have any smokeless tobacco history on file. He reports that he does not drink alcohol. Family Hx: family history includes Stroke in his mother. No Known Allergies       OBJECTIVE:  Wt Readings from Last 3 Encounters:   11/21/22 81.6 kg (180 lb)   01/08/16 92.1 kg (203 lb)   01/05/15 84.8 kg (187 lb)       Intake/Output Summary (Last 24 hours) at 11/22/2022 0806  Last data filed at 11/22/2022 0410  Gross per 24 hour   Intake 20.41 ml   Output --   Net 20.41 ml       Physical Exam:    Vitals:   Vitals:    11/22/22 0400 11/22/22 0500 11/22/22 0600 11/22/22 0700   BP: (!) 121/40 (!) 120/51 (!) 134/45 (!) 130/51   Pulse: 78 (!) 117 90 83   Resp: 14 22 16 22   Temp:       SpO2: 95% 96% 96% 95%   Weight:       Height:         Telemetry:  AV block     Gen: Well-developed, well-nourished, in no acute distress  Neck: Supple, No JVD, No Carotid Bruit  Resp: No accessory muscle use, Clear breath sounds, No rales or rhonchi  Card: Regular Rate,Rythm, Normal S1, S2, No murmurs, rubs or gallop. No thrills. Abd:   Soft, non-tender, non-distended, BS+   MSK: No cyanosis  Skin: No rashes    Neuro: Moving all four extremities, follows commands appropriately  Psych: Good insight, oriented to person, place, alert, Nml Affect  LE: No edema    Data Review:     Radiology:   XR Results (most recent):  Results from Hospital Encounter encounter on 11/21/22    XR CHEST PORT    Narrative  INDICATION:  Syncope    COMPARISON: None    FINDINGS: Single AP portable view of the chest obtained at 1726 demonstrates an  enlarged cardiac silhouette. The lungs are clear bilaterally. No osseous  abnormalities are seen. Impression  Cardiomegaly. No focal pulmonary process.       Recent Labs     11/21/22  1653   *   K 4.1   CL 97*   CO2 22   BUN 24*   CREA 0.93   *   CA 8.9     Recent Labs 11/21/22  1653   WBC 12.0*   HGB 15.4   HCT 45.8        Recent Labs     11/21/22  1653        No results for input(s): CHOL, LDLC in the last 72 hours. No lab exists for component: TGL, HDLC,  HBA1C      Current meds:    Current Facility-Administered Medications:     DOPamine (INTROPIN) 800 mg in dextrose 5% 250 mL infusion, 0-20 mcg/kg/min, IntraVENous, TITRATE, Anju Veloz MD, Last Rate: 23 mL/hr at 11/22/22 0730, 15 mcg/kg/min at 11/22/22 0730    prochlorperazine (COMPAZINE) injection 10 mg, 10 mg, IntraVENous, Q6H PRN, Lucille Mari MD, 10 mg at 11/22/22 0445    sodium chloride (NS) flush 5-40 mL, 5-40 mL, IntraVENous, Q8H, Ramona Hernandez MD, 10 mL at 11/22/22 0730    sodium chloride (NS) flush 5-40 mL, 5-40 mL, IntraVENous, PRN, Ramona Hernandez MD    sodium chloride (NS) flush 5-40 mL, 5-40 mL, IntraVENous, Q8H, Anju Veloz MD, 10 mL at 11/22/22 0730    sodium chloride (NS) flush 5-40 mL, 5-40 mL, IntraVENous, PRN, Lucille Mari MD    acetaminophen (TYLENOL) tablet 650 mg, 650 mg, Oral, Q6H PRN **OR** acetaminophen (TYLENOL) suppository 650 mg, 650 mg, Rectal, Q6H PRN, Lucille Mari MD    polyethylene glycol (MIRALAX) packet 17 g, 17 g, Oral, DAILY PRN, Lucille Mari MD    ondansetron (ZOFRAN ODT) tablet 4 mg, 4 mg, Oral, Q8H PRN **OR** ondansetron (ZOFRAN) injection 4 mg, 4 mg, IntraVENous, Q6H PRN, Lucille Mari MD, 4 mg at 11/22/22 0302    enoxaparin (LOVENOX) injection 40 mg, 40 mg, SubCUTAneous, DAILY, Anju Veloz MD Arlice Salon, GHASSAN    [facility] Cardiology  Call center: (B) [phone]  (A) [phone]      CC: Dimas, MD Jose

## 2022-11-22 NOTE — ED NOTES
This RN to patient's room to prepare patient for transfer to ICU. Patient was resting comfortably with stable vitals in NSR. This RN was advising patient of current plan of care. Patient lifted his head off the bed and he heart rate drop and he had a significant pause in his rhythm and had a some p waves. Patient's HR up to 26-32 with a 3rd degree AV block. ER MD and attending MD notified. EKG obtained, pads placed on pt and on [place]. Attending MD came down to see the patient. Advised to transfer to ICU.  This RN, second primary RN, ICU RN, attending MD transferred patient to ICU bed 8

## 2022-11-22 NOTE — Clinical Note
Left femoral vein. Accessed successfully. Femoral access needle used. Using ultrasound guidance. Number of attempts =  5.

## 2022-11-22 NOTE — PROGRESS NOTES
0945 spoke with Transfer/access center. Dr Joy Daniel and Dr Gee Romano accepting. Awaiting bed assignment. Transfer center/access center to call back once bed is available and assigned and aware critical care ground transport will be the need for transportation.

## 2022-11-22 NOTE — PROGRESS NOTES
0225 Pt arrived from ER in 3rd degree With Dr. Kami Reeves accompanying patient. HR 24 on the monitor. RRT, Nehemias Meraz, at bedside. Per MD pt was given atropine times 2 on arrival see MAR. Cardiology leo. Magdiel spoke with Dr. Elisha Malloy with Cards who requested that patient be transferred to [facility]. Per nursing supervisor, Kaitlyn Gaspar, no beds available at [facility]. Informed Dr. Kami Reeves. Spoke with supervisor, Kaitlyn Gaspar, again. Pt to be transferred to the ED at [facility].  0255 Dobutamine started per telephone order between Dr. Kami Reeves and Dr. Elisha Malloy. 0330 Dr. Kami Reeves called Dr. Placido Loja, hospitalist at [facility] to see if he would accept patient. Per Dr. Placido Loja he is not accepting. 1492 Dr. Kami Reeves called ER at [facility] to see if ED MD would accept the patient. 0600 Since last entry pt remains at [facility]. Intensive MD Vergara pagestephan and spoke with Dr. Sophie Dela Cruz. No order to move patient at this time. Pt in 2nd degree heart block. Remains attached to pacer pads. Code cart at the bedside. Will continue to monitor.

## 2022-11-22 NOTE — PROGRESS NOTES
Reason for Admission:  symptomatic bradycardia,lightheaded with positional changes                     RUR Score:          9%           Plan for utilizing home health:      to be determined by discharge    PCP: First and Last name:  Patient First     Name of Practice:    Are you a current patient: Yes/No: yes   Approximate date of last visit:    Can you participate in a virtual visit with your PCP: no                    Current Advanced Directive/Advance Care Plan: Full Code      Healthcare Decision Maker:   Ebony Dejesus @ [phone]                             Transition of Care Plan:              Problems:  Syncope  Symptomatic bradycardia        Childhood polio     Prior to admission,pt passed out @ the [employer]    Pt lives by himself @ the address on demographics. His sister(s) live in [address] [place] [address]. Pt uses a brace and crutches to ambulate as he had polio as a child. He spent his birthday in a [facility] hospital with polio as a child. Pt stated\"I am a [employer] brat. \"\"We lived all over. \"  Pt is currently on dopamine infusion. The plan is to transfer pt to [facility] once we have an accepting physician and bed assignment. In anticipation of transport,I have completed the physician certification [facility]) for transport.     Kalyan Jacobo

## 2022-11-22 NOTE — PROGRESS NOTES
0700 Bedside and Verbal shift change report given to Jorge A Ghotra RN (oncoming nurse) by Roby Rollins RN (offgoing nurse). Report included the following information SBAR, ED Summary, Intake/Output, MAR, Recent Results, Med Rec Status, and Cardiac Rhythm 3rd degree HB . Primary Nurse Severino Hatch RN and Roby Rollins RN performed a dual skin assessment on this patient No impairment noted  Binh score is see flowsheet. 0730 Patient assessed, see flowsheet. Patient is alert and oriented x4, pupils are round and reactive, patient does not report any weakness, numbness/tingling, or lightheadedness. All extremities have purposeful movement and pulses felt in all extremities. Patient is in 3rd degree heart block, defibrillator pads attached to patient, defibrillator at bedside. Heart sounds heard, lung sounds clear, and bowel sounds active. Patient is on Dopamine at 15 mcg.     0800 Patient received breakfast.      0815 Patient is now NPO, per NP. Breakfast tray removed. Patient ate a few bites of eggs and a few bites of potatoes. NP aware. 0900 Lovenox held per NP.     1122 Dopamine rate changed to 16 mcg.     1200 Patient reassessed, no change, see flowsheet. 1300 TRANSFER - OUT REPORT:    Verbal report given to Mariano Leyva RN (name) on Pat Liter  being transferred to CCU [facility] (unit) for routine progression of care       Report consisted of patients Situation, Background, Assessment and   Recommendations(SBAR). Information from the following report(s) SBAR, ED Summary, Intake/Output, MAR, Recent Results, Med Rec Status, and Cardiac Rhythm 3rd degree heart block  was reviewed with the receiving nurse.     Lines:   Peripheral IV 11/21/22 Right Antecubital (Active)   Site Assessment Clean, dry, & intact 11/22/22 1200   Phlebitis Assessment 0 11/22/22 1200   Infiltration Assessment 0 11/22/22 1200   Dressing Status Clean, dry, & intact 11/22/22 1200   Dressing Type Transparent 11/22/22 1200   Hub Color/Line Status Blue; Infusing;Patent;Capped 11/22/22 1200   Action Taken Open ports on tubing capped 11/22/22 1200   Alcohol Cap Used Yes 11/22/22 1200       Peripheral IV 11/21/22 Left Antecubital (Active)   Site Assessment Clean, dry, & intact 11/22/22 1200   Phlebitis Assessment 0 11/22/22 1200   Infiltration Assessment 0 11/22/22 1200   Dressing Status Clean, dry, & intact 11/22/22 1200   Dressing Type Transparent 11/22/22 1200   Hub Color/Line Status Green 11/22/22 1200   Action Taken Open ports on tubing capped 11/22/22 1200   Alcohol Cap Used Yes 11/22/22 1200        Opportunity for questions and clarification was provided.       Patient transported with:   Monitor  Registered Nurse  Critical Care Transport

## 2022-11-22 NOTE — H&P
CRITICAL CARE NOTE      Name: Radha Woodson   : 1945   MRN: [id number]   Date: 2022      REASON FOR ICU ADMISSION:  2:1 heart block    PRINCIPAL ICU DIAGNOSIS   Mobitz II heart block    BRIEF PATIENT SUMMARY   65 M with post polio syndrome and hypertension presented to [facility]  after syncope episode and found to have Mobitz II heart block. It was noted that he takes atenolol for hypertension. He was monitored overnight. Echocardiogram revealed normal LV function. Heart block persisted into morning of  and he was transferred to [facility] for possible PPM placement    [facility]    Admitted to [facility] after syncope   Transferred to [facility]. 2:1 HB persists.  Asymptomatic while @ rest    COMPREHENSIVE ASSESSMENT & PLAN:SYSTEM BASED     NEUROLOGICAL:   Post polio syndrome  No acute issues  Cognition intact  Monitor    PULMONOLOGY:   No acute issues  Remote minimal smoking history  No prior pulmonary history  Continuous pulse ox  Supplemental O2 as needed    CARDIOVASCULAR:   History of hypertension on atenolol - last dose   2:1 heart block  Cardiology to see  Continue dopamine gtt for now    GASTROINTESTINAL   No acute issues  Reg diet ordered    RENAL/ELECTROLYTE/FLUIDS:   Very mild hyponatremia  Monitor chemistry panels daily  Correct electrolytes as indicated    ENDOCRINE:   Very mild hyperglycemia without prior dx of DM  Monitor glucose on chem panels  Glu goal 100-180  SSI if needed    HEMATOLOGY/ONCOLOGY:   No issues  Follow CBC  Transfuse per usual ICU guidelines  DVTppx: LMWH    INFECTIOUS DISEASE:   No issues    ANTIBIOTICS TO DATE:    CULTURES TO DATE:      ICU DAILY CHECKLIST     Code Status:full  DVT Prophylaxis:enoxaparin  T/L/D: Tubes:   Lines:   Drains:   SUP: N/I  Diet: Reg diet  Activity Level: As tolerated  ABCDEF Bundle/Checklist Completed:Yes  Disposition: Stay in ICU  Multidisciplinary Rounds Completed:  Pending  Goals of Care Discussion/Palliative: N/A  Patient/Family Updated: Yes          SUBJECTIVE   Review of Systems   All other systems reviewed and are negative. OBJECTIVE     Labs and Data: Reviewed 22  Medications: Reviewed 22  Imaging: Reviewed 22    Physical Exam  Vitals reviewed. Constitutional:       General: He is not in acute distress. HENT:      Head: Normocephalic and atraumatic. Eyes:      Extraocular Movements: Extraocular movements intact. Conjunctiva/sclera: Conjunctivae normal.      Pupils: Pupils are equal, round, and reactive to light. Cardiovascular:      Rate and Rhythm: Regular rhythm. Bradycardia present. Pulses: Normal pulses. Heart sounds: Murmur heard. Comments: 2:1 block. II/VI systolic murmur  Pulmonary:      Effort: Pulmonary effort is normal. No respiratory distress. Breath sounds: Normal breath sounds. Abdominal:      General: Abdomen is flat. Bowel sounds are normal.      Palpations: Abdomen is soft. Musculoskeletal:         General: Deformity present. Cervical back: Neck supple. Comments: BLEs atrophic. Trace symmetric pretibial edema   Skin:     General: Skin is warm and dry. Capillary Refill: Capillary refill takes less than 2 seconds. Neurological:      Mental Status: He is alert. Mental status is at baseline. Psychiatric:         Mood and Affect: Mood normal.         Thought Content:  Thought content normal.        Visit Vitals  /62   Pulse (!) 48   Temp 97.9 °F (36.6 °C)   Resp 13   Wt 84.6 kg (186 lb 8.2 oz)   SpO2 93%   BMI 24.61 kg/m²        Temp (24hrs), Av.3 °F (36.8 °C), Min:97.9 °F (36.6 °C), Max:99 °F (37.2 °C)           Intake/Output:     Intake/Output Summary (Last 24 hours) at 2022 1420  Last data filed at 2022 1413  Gross per 24 hour   Intake --   Output 125 ml   Net -125 ml       CXR:  no acute findings      ECHO ADULT COMPLETE 2022    Interpretation Summary    Left Ventricle: Normal left ventricular systolic function with a visually estimated EF of 55 - 60%. Left ventricle size is normal. Normal wall thickness. Normal wall motion. Grade I diastolic dysfunction with normal LAP. Aortic Valve: Moderately calcified cusp. Moderate sclerosis of the aortic valve cusp. Mild regurgitation. Mild stenosis of the aortic valve. Mitral Valve: Valve structure is normal. Mild annular calcification of the mitral valve. Pulmonary Arteries: Mild pulmonary hypertension present. Signed by: Harpreet Braden MD on 11/22/2022  1:15 PM         CRITICAL CARE DOCUMENTATION  I had a face to face encounter with the patient, reviewed and interpreted patient data including clinical events, labs, images, vital signs, I/O's, and examined patient. I have discussed the case and the plan and management of the patient's care with the consulting services, the bedside nurses and the respiratory therapist.      NOTE OF PERSONAL INVOLVEMENT IN CARE   This patient has a high probability of imminent, clinically significant deterioration, which requires the highest level of preparedness to intervene urgently. I participated in the decision-making and personally managed or directed the management of the following life and organ supporting interventions that required my frequent assessment to treat or prevent imminent deterioration. I personally spent -- minutes of critical care time. This is time spent at this critically ill patient's bedside actively involved in patient care as well as the coordination of care. This does not include any procedural time which has been billed separately.     Maria Eugenia Guo MD   Critical Care Medicine  [facility]

## 2022-11-22 NOTE — ED NOTES
Verbal shift change report given to Brittany Rosario (oncoming nurse) by Sandra Yang (offgoing nurse). Report included the following information SBAR, Kardex, ED Summary, [facility] and Recent Results.

## 2022-11-23 ENCOUNTER — ANESTHESIA (OUTPATIENT)
Dept: CARDIAC CATH/INVASIVE PROCEDURES | Age: 77
DRG: 229 | End: 2022-11-23
Payer: MEDICARE

## 2022-11-23 ENCOUNTER — ANESTHESIA EVENT (OUTPATIENT)
Dept: CARDIAC CATH/INVASIVE PROCEDURES | Age: 77
DRG: 229 | End: 2022-11-23
Payer: MEDICARE

## 2022-11-23 LAB
ABO + RH BLD: NORMAL
ACT BLD: 137 SECS (ref 79–138)
ANION GAP SERPL CALC-SCNC: 9 MMOL/L (ref 5–15)
ATRIAL RATE: 77 BPM
ATRIAL RATE: 82 BPM
BLOOD GROUP ANTIBODIES SERPL: NORMAL
BUN SERPL-MCNC: 32 MG/DL (ref 6–20)
BUN/CREAT SERPL: 31 (ref 12–20)
CALCIUM SERPL-MCNC: 8.6 MG/DL (ref 8.5–10.1)
CALCULATED P AXIS, ECG09: 31 DEGREES
CALCULATED P AXIS, ECG09: 52 DEGREES
CALCULATED R AXIS, ECG10: -17 DEGREES
CALCULATED R AXIS, ECG10: 32 DEGREES
CALCULATED T AXIS, ECG11: 124 DEGREES
CALCULATED T AXIS, ECG11: 19 DEGREES
CHLORIDE SERPL-SCNC: 104 MMOL/L (ref 97–108)
CO2 SERPL-SCNC: 24 MMOL/L (ref 21–32)
CREAT SERPL-MCNC: 1.03 MG/DL (ref 0.7–1.3)
DIAGNOSIS, 93000: NORMAL
DIAGNOSIS, 93000: NORMAL
ERYTHROCYTE [DISTWIDTH] IN BLOOD BY AUTOMATED COUNT: 12.8 % (ref 11.5–14.5)
ERYTHROCYTE [DISTWIDTH] IN BLOOD BY AUTOMATED COUNT: 12.8 % (ref 11.5–14.5)
GLUCOSE SERPL-MCNC: 140 MG/DL (ref 65–100)
HCT VFR BLD AUTO: 39.9 % (ref 36.6–50.3)
HCT VFR BLD AUTO: 43.1 % (ref 36.6–50.3)
HGB BLD-MCNC: 13.6 G/DL (ref 12.1–17)
HGB BLD-MCNC: 14.8 G/DL (ref 12.1–17)
MCH RBC QN AUTO: 32.4 PG (ref 26–34)
MCH RBC QN AUTO: 32.6 PG (ref 26–34)
MCHC RBC AUTO-ENTMCNC: 34.1 G/DL (ref 30–36.5)
MCHC RBC AUTO-ENTMCNC: 34.3 G/DL (ref 30–36.5)
MCV RBC AUTO: 94.3 FL (ref 80–99)
MCV RBC AUTO: 95.7 FL (ref 80–99)
NRBC # BLD: 0 K/UL (ref 0–0.01)
NRBC # BLD: 0 K/UL (ref 0–0.01)
NRBC BLD-RTO: 0 PER 100 WBC
NRBC BLD-RTO: 0 PER 100 WBC
PLATELET # BLD AUTO: 185 K/UL (ref 150–400)
PLATELET # BLD AUTO: 196 K/UL (ref 150–400)
PMV BLD AUTO: 10.4 FL (ref 8.9–12.9)
PMV BLD AUTO: 10.8 FL (ref 8.9–12.9)
POTASSIUM SERPL-SCNC: 4.4 MMOL/L (ref 3.5–5.1)
Q-T INTERVAL, ECG07: 466 MS
Q-T INTERVAL, ECG07: 550 MS
QRS DURATION, ECG06: 130 MS
QRS DURATION, ECG06: 132 MS
QTC CALCULATION (BEZET), ECG08: 388 MS
QTC CALCULATION (BEZET), ECG08: 563 MS
RBC # BLD AUTO: 4.17 M/UL (ref 4.1–5.7)
RBC # BLD AUTO: 4.57 M/UL (ref 4.1–5.7)
SODIUM SERPL-SCNC: 137 MMOL/L (ref 136–145)
SPECIMEN EXP DATE BLD: NORMAL
VENTRICULAR RATE, ECG03: 30 BPM
VENTRICULAR RATE, ECG03: 88 BPM
WBC # BLD AUTO: 16.8 K/UL (ref 4.1–11.1)
WBC # BLD AUTO: 17.7 K/UL (ref 4.1–11.1)

## 2022-11-23 PROCEDURE — 74011250636 HC RX REV CODE- 250/636: Performed by: NURSE ANESTHETIST, CERTIFIED REGISTERED

## 2022-11-23 PROCEDURE — 2709999900 HC NON-CHARGEABLE SUPPLY: Performed by: INTERNAL MEDICINE

## 2022-11-23 PROCEDURE — 86900 BLOOD TYPING SEROLOGIC ABO: CPT

## 2022-11-23 PROCEDURE — 74011250636 HC RX REV CODE- 250/636: Performed by: INTERNAL MEDICINE

## 2022-11-23 PROCEDURE — C1892 INTRO/SHEATH,FIXED,PEEL-AWAY: HCPCS | Performed by: INTERNAL MEDICINE

## 2022-11-23 PROCEDURE — 76937 US GUIDE VASCULAR ACCESS: CPT | Performed by: INTERNAL MEDICINE

## 2022-11-23 PROCEDURE — 74011000250 HC RX REV CODE- 250: Performed by: INTERNAL MEDICINE

## 2022-11-23 PROCEDURE — 85347 COAGULATION TIME ACTIVATED: CPT

## 2022-11-23 PROCEDURE — 33016 PERICARDIOCENTESIS W/IMAGING: CPT | Performed by: INTERNAL MEDICINE

## 2022-11-23 PROCEDURE — C1760 CLOSURE DEV, VASC: HCPCS | Performed by: INTERNAL MEDICINE

## 2022-11-23 PROCEDURE — 74011000250 HC RX REV CODE- 250: Performed by: NURSE PRACTITIONER

## 2022-11-23 PROCEDURE — 77030005320 HC CATH PACE TEMP STJU -B: Performed by: INTERNAL MEDICINE

## 2022-11-23 PROCEDURE — 65620000000 HC RM CCU GENERAL

## 2022-11-23 PROCEDURE — 74011250637 HC RX REV CODE- 250/637: Performed by: INTERNAL MEDICINE

## 2022-11-23 PROCEDURE — 36415 COLL VENOUS BLD VENIPUNCTURE: CPT

## 2022-11-23 PROCEDURE — 99233 SBSQ HOSP IP/OBS HIGH 50: CPT | Performed by: INTERNAL MEDICINE

## 2022-11-23 PROCEDURE — 02HK3NZ INSERTION OF INTRACARDIAC PACEMAKER INTO RIGHT VENTRICLE, PERCUTANEOUS APPROACH: ICD-10-PCS | Performed by: INTERNAL MEDICINE

## 2022-11-23 PROCEDURE — C1769 GUIDE WIRE: HCPCS | Performed by: INTERNAL MEDICINE

## 2022-11-23 PROCEDURE — 77030003390 HC NDL ANGI MRTM -A: Performed by: INTERNAL MEDICINE

## 2022-11-23 PROCEDURE — 77030004532 HC CATH ANGI DX IMP BSC -A: Performed by: INTERNAL MEDICINE

## 2022-11-23 PROCEDURE — 77030012929 HC DIL URET COOK -C: Performed by: INTERNAL MEDICINE

## 2022-11-23 PROCEDURE — 80048 BASIC METABOLIC PNL TOTAL CA: CPT

## 2022-11-23 PROCEDURE — 33274 TCAT INSJ/RPL PERM LDLS PM: CPT | Performed by: INTERNAL MEDICINE

## 2022-11-23 PROCEDURE — 77030013785 HC KT PERICARDCENT BSC -C: Performed by: INTERNAL MEDICINE

## 2022-11-23 PROCEDURE — 77030041244 HC CBL PACE EXT TEMP REMG -B: Performed by: INTERNAL MEDICINE

## 2022-11-23 PROCEDURE — 77030018729 HC ELECTRD DEFIB PAD CARD -B: Performed by: INTERNAL MEDICINE

## 2022-11-23 PROCEDURE — C1894 INTRO/SHEATH, NON-LASER: HCPCS | Performed by: INTERNAL MEDICINE

## 2022-11-23 PROCEDURE — 76060000036 HC ANESTHESIA 2.5 TO 3 HR: Performed by: INTERNAL MEDICINE

## 2022-11-23 PROCEDURE — 93308 TTE F-UP OR LMTD: CPT | Performed by: INTERNAL MEDICINE

## 2022-11-23 PROCEDURE — C1786 PMKR, SINGLE, RATE-RESP: HCPCS | Performed by: INTERNAL MEDICINE

## 2022-11-23 PROCEDURE — 0W9D3ZZ DRAINAGE OF PERICARDIAL CAVITY, PERCUTANEOUS APPROACH: ICD-10-PCS | Performed by: INTERNAL MEDICINE

## 2022-11-23 PROCEDURE — 85027 COMPLETE CBC AUTOMATED: CPT

## 2022-11-23 PROCEDURE — 74011250636 HC RX REV CODE- 250/636: Performed by: NURSE PRACTITIONER

## 2022-11-23 RX ORDER — PROPOFOL 10 MG/ML
INJECTION, EMULSION INTRAVENOUS
Status: DISCONTINUED | OUTPATIENT
Start: 2022-11-23 | End: 2022-11-23 | Stop reason: HOSPADM

## 2022-11-23 RX ORDER — EPINEPHRINE 1 MG/ML
INJECTION, SOLUTION, CONCENTRATE INTRAVENOUS AS NEEDED
Status: DISCONTINUED | OUTPATIENT
Start: 2022-11-23 | End: 2022-11-23 | Stop reason: HOSPADM

## 2022-11-23 RX ORDER — SODIUM CHLORIDE 9 MG/ML
INJECTION, SOLUTION INTRAVENOUS
Status: DISCONTINUED | OUTPATIENT
Start: 2022-11-23 | End: 2022-11-23 | Stop reason: HOSPADM

## 2022-11-23 RX ORDER — SODIUM CHLORIDE 9 MG/ML
125 INJECTION, SOLUTION INTRAVENOUS CONTINUOUS
Status: DISCONTINUED | OUTPATIENT
Start: 2022-11-23 | End: 2022-11-23

## 2022-11-23 RX ORDER — MIDAZOLAM HYDROCHLORIDE 1 MG/ML
INJECTION, SOLUTION INTRAMUSCULAR; INTRAVENOUS AS NEEDED
Status: DISCONTINUED | OUTPATIENT
Start: 2022-11-23 | End: 2022-11-23 | Stop reason: HOSPADM

## 2022-11-23 RX ORDER — SODIUM CHLORIDE 0.9 % (FLUSH) 0.9 %
5-40 SYRINGE (ML) INJECTION AS NEEDED
Status: DISCONTINUED | OUTPATIENT
Start: 2022-11-23 | End: 2022-11-23 | Stop reason: HOSPADM

## 2022-11-23 RX ORDER — ESMOLOL HYDROCHLORIDE 10 MG/ML
INJECTION INTRAVENOUS AS NEEDED
Status: DISCONTINUED | OUTPATIENT
Start: 2022-11-23 | End: 2022-11-23 | Stop reason: HOSPADM

## 2022-11-23 RX ORDER — HEPARIN SODIUM 1000 [USP'U]/ML
INJECTION, SOLUTION INTRAVENOUS; SUBCUTANEOUS AS NEEDED
Status: DISCONTINUED | OUTPATIENT
Start: 2022-11-23 | End: 2022-11-23 | Stop reason: HOSPADM

## 2022-11-23 RX ORDER — PROTAMINE SULFATE 10 MG/ML
INJECTION, SOLUTION INTRAVENOUS AS NEEDED
Status: DISCONTINUED | OUTPATIENT
Start: 2022-11-23 | End: 2022-11-23 | Stop reason: HOSPADM

## 2022-11-23 RX ORDER — SODIUM CHLORIDE 0.9 % (FLUSH) 0.9 %
5-40 SYRINGE (ML) INJECTION EVERY 8 HOURS
Status: DISCONTINUED | OUTPATIENT
Start: 2022-11-23 | End: 2022-11-23 | Stop reason: HOSPADM

## 2022-11-23 RX ORDER — LIDOCAINE HYDROCHLORIDE 10 MG/ML
INJECTION INFILTRATION; PERINEURAL AS NEEDED
Status: DISCONTINUED | OUTPATIENT
Start: 2022-11-23 | End: 2022-11-23 | Stop reason: HOSPADM

## 2022-11-23 RX ORDER — CEPHALEXIN 250 MG/1
500 CAPSULE ORAL 3 TIMES DAILY
Status: DISCONTINUED | OUTPATIENT
Start: 2022-11-23 | End: 2022-11-26 | Stop reason: HOSPADM

## 2022-11-23 RX ADMIN — SODIUM CHLORIDE 125 ML/HR: 9 INJECTION, SOLUTION INTRAVENOUS at 19:01

## 2022-11-23 RX ADMIN — PHENYLEPHRINE HYDROCHLORIDE 100 MCG/MIN: 10 INJECTION INTRAVENOUS at 19:52

## 2022-11-23 RX ADMIN — WATER 2 G: 1 INJECTION INTRAMUSCULAR; INTRAVENOUS; SUBCUTANEOUS at 18:08

## 2022-11-23 RX ADMIN — ESMOLOL HYDROCHLORIDE 30 MG: 10 INJECTION, SOLUTION INTRAVENOUS at 17:59

## 2022-11-23 RX ADMIN — SODIUM CHLORIDE, POTASSIUM CHLORIDE, SODIUM LACTATE AND CALCIUM CHLORIDE 50 ML/HR: 600; 310; 30; 20 INJECTION, SOLUTION INTRAVENOUS at 01:06

## 2022-11-23 RX ADMIN — EPINEPHRINE 0.05 MG: 1 INJECTION INTRAMUSCULAR; INTRAVENOUS; SUBCUTANEOUS at 17:55

## 2022-11-23 RX ADMIN — SODIUM CHLORIDE, PRESERVATIVE FREE 10 ML: 5 INJECTION INTRAVENOUS at 19:06

## 2022-11-23 RX ADMIN — WATER 2 G: 1 INJECTION INTRAMUSCULAR; INTRAVENOUS; SUBCUTANEOUS at 16:05

## 2022-11-23 RX ADMIN — PHENYLEPHRINE HYDROCHLORIDE 240 MCG: 10 INJECTION INTRAVENOUS at 17:53

## 2022-11-23 RX ADMIN — DOPAMINE HYDROCHLORIDE IN DEXTROSE 14 MCG/KG/MIN: 3.2 INJECTION, SOLUTION INTRAVENOUS at 02:21

## 2022-11-23 RX ADMIN — SODIUM CHLORIDE: 900 INJECTION, SOLUTION INTRAVENOUS at 17:43

## 2022-11-23 RX ADMIN — SODIUM CHLORIDE, PRESERVATIVE FREE 10 ML: 5 INJECTION INTRAVENOUS at 21:43

## 2022-11-23 RX ADMIN — DOPAMINE HYDROCHLORIDE IN DEXTROSE 14 MCG/KG/MIN: 3.2 INJECTION, SOLUTION INTRAVENOUS at 14:40

## 2022-11-23 RX ADMIN — PHENYLEPHRINE HYDROCHLORIDE 80 MCG: 10 INJECTION INTRAVENOUS at 17:31

## 2022-11-23 RX ADMIN — SODIUM CHLORIDE: 900 INJECTION, SOLUTION INTRAVENOUS at 16:00

## 2022-11-23 RX ADMIN — MIDAZOLAM 2 MG: 1 INJECTION INTRAMUSCULAR; INTRAVENOUS at 16:02

## 2022-11-23 RX ADMIN — SODIUM CHLORIDE, PRESERVATIVE FREE 10 ML: 5 INJECTION INTRAVENOUS at 05:44

## 2022-11-23 RX ADMIN — PROTAMINE SULFATE 50 MG: 10 INJECTION, SOLUTION INTRAVENOUS at 17:37

## 2022-11-23 RX ADMIN — HEPARIN SODIUM 5000 UNITS: 1000 INJECTION, SOLUTION INTRAVENOUS; SUBCUTANEOUS at 16:40

## 2022-11-23 RX ADMIN — CEPHALEXIN 500 MG: 500 CAPSULE ORAL at 21:43

## 2022-11-23 RX ADMIN — SODIUM CHLORIDE, POTASSIUM CHLORIDE, SODIUM LACTATE AND CALCIUM CHLORIDE 500 ML: 600; 310; 30; 20 INJECTION, SOLUTION INTRAVENOUS at 19:02

## 2022-11-23 RX ADMIN — PROPOFOL 35 MCG/KG/MIN: 10 INJECTION, EMULSION INTRAVENOUS at 16:04

## 2022-11-23 NOTE — PROGRESS NOTES
[facility]    ICU TEAM Progress Note    Name: Ector Andino   : 1945   MRN: [id number]   Date: 2022      Assessment and Plan:     ICU Problems:  Bradycardia   High degree heart block        Briefly, 65 M with post polio syndrome and hypertension presented to [facility]  after syncope episode and found to have Mobitz II heart block. It was noted that he takes atenolol for hypertension. He was monitored overnight. Echocardiogram revealed normal LV function. Heart block persisted into morning of  and he was transferred to [facility] for possible PPM placement    For bradycardi pt with intermittent CHB and Mobitz II  - On dopamine.   - PPM today              POD:  Day of Surgery    S/P:   Procedure(s):  INSERT PPM DUAL    Active Problem List:     Problem List  Date Reviewed: 2016            Codes Class    Heart block ICD-10-CM: I45.9  ICD-9-CM: 426.9         Symptomatic bradycardia ICD-10-CM: R00.1  ICD-9-CM: 427.89         Syncope ICD-10-CM: R55  ICD-9-CM: 780.2         H/O atrial tachycardia ICD-10-CM: Z86.79  ICD-9-CM: V12.59         HTN (hypertension) ICD-10-CM: I10  ICD-9-CM: 401.9         Polio ICD-10-CM: A80.9  ICD-9-CM: 045.90            Past Medical History:      has a past medical history of Dizzy spells and Irregular heart beats. Past Surgical History:      has a past surgical history that includes hx orthopaedic (). Home Medications:     Prior to Admission medications    Medication Sig Start Date End Date Taking? Authorizing Provider   atenoloL (TENORMIN) 50 mg tablet Take 75 mg by mouth daily.    Yes Provider, Historical       Allergies/Social/Family History:     No Known Allergies   Social History     Tobacco Use    Smoking status: Former    Smokeless tobacco: Not on file   Substance Use Topics    Alcohol use: No     Alcohol/week: 0.0 standard drinks      Family History   Problem Relation Age of Onset    Stroke Mother          Objective:   Vital Signs:  Visit Vitals  BP (!) 170/62 (BP 1 Location: Left arm, BP Patient Position: At rest)   Pulse (!) 42   Temp 98.7 °F (37.1 °C)   Resp 19   Wt 82 kg (180 lb 12.4 oz)   SpO2 96%   BMI 23.85 kg/m²    O2 Flow Rate (L/min): 3 l/min O2 Device: Nasal cannula Temp (24hrs), Av.4 °F (36.9 °C), Min:97.9 °F (36.6 °C), Max:98.7 °F (37.1 °C)           Intake/Output:     Intake/Output Summary (Last 24 hours) at 2022 1029  Last data filed at 2022 0800  Gross per 24 hour   Intake 1471.83 ml   Output 1700 ml   Net -228.17 ml       Physical Exam:    Gen:  NAD  Neuro:  CH II-XII intact, no focal deficits, fully oriented  Resp:  CTAB  CV:  Doyle  Abd soft  Extremities:  No edema    LABS AND  DATA: Personally reviewed  Recent Labs     22  0358 22  1653   WBC 16.8* 12.0*   HGB 14.8 15.4   HCT 43.1 45.8    224     Recent Labs     22  0358 22  1653    134*   K 4.4 4.1    97*   CO2 24 22   BUN 32* 24*   CREA 1.03 0.93   * 125*   CA 8.6 8.9   MG  --  1.8     Recent Labs     22  1653      TP 6.6   ALB 3.6   GLOB 3.0     No results for input(s): INR, PTP, APTT, INREXT in the last 72 hours. No results for input(s): PHI, PCO2I, PO2I, FIO2I in the last 72 hours. No results for input(s): CPK, CKMB, TROIQ, BNPP in the last 72 hours. Hemodynamics:   PAP:   CO:     Wedge:   CI:     CVP:    SVR:       PVR:       Ventilator Settings:  Mode Rate Tidal Volume Pressure FiO2 PEEP                    Peak airway pressure:      Minute ventilation:          MEDS: Reviewed    Chest X-Ray:  CXR Results  (Last 48 hours)                 22 1728  XR CHEST PORT Final result    Impression:  Cardiomegaly. No focal pulmonary process. Narrative:  INDICATION:  Syncope       COMPARISON: None       FINDINGS: Single AP portable view of the chest obtained at 1726 demonstrates an   enlarged cardiac silhouette. The lungs are clear bilaterally. No osseous   abnormalities are seen. ECHO:      Left Ventricle: Normal left ventricular systolic function with a visually estimated EF of 55 - 60%. Left ventricle size is normal. Normal wall thickness. Normal wall motion. Grade I diastolic dysfunction with normal LAP. Aortic Valve: Moderately calcified cusp. Moderate sclerosis of the aortic valve cusp. Mild regurgitation. Mild stenosis of the aortic valve. Mitral Valve: Valve structure is normal. Mild annular calcification of the mitral valve. Pulmonary Arteries: Mild pulmonary hypertension present. SPECIAL EQUIPMENT  None    DISPOSITION  Stay in ICU    CRITICAL CARE CONSULTANT NOTE  I had a face to face encounter with the patient, reviewed and interpreted patient data including clinical events, labs, images, vital signs, I/O's, and examined patient. I have discussed the case and the plan and management of the patient's care with the consulting services, the bedside nurses and the respiratory therapist.      NOTE OF PERSONAL INVOLVEMENT IN CARE   This patient has a high probability of imminent, clinically significant deterioration, which requires the highest level of preparedness to intervene urgently. I participated in the decision-making and personally managed or directed the management of the following life and organ supporting interventions that required my frequent assessment to treat or prevent imminent deterioration. I personally spent 40 minutes of critical care time. This is time spent at this critically ill patient's bedside actively involved in patient care as well as the coordination of care and discussions with the patient's family. This does not include any procedural time which has been billed separately.       [address] [facility]

## 2022-11-23 NOTE — ADDENDUM NOTE
Addendum  created 11/23/22 1850 by Samm Escobar CRNA    Child order released for a procedure order, Clinical Note Signed, Flowsheet accepted, Intraprocedure Blocks edited, LDA created via procedure documentation, SmartForm saved

## 2022-11-23 NOTE — PROGRESS NOTES
Verbal bedside shift change report given to Haleigh Nj (oncoming nurse) by MOM (offgoing nurse). Report included the following information SBAR, Kardex, ED Summary, Intake/Output, MAR, Recent Results, Med Rec Status, Cardiac Rhythm 2\" BlockType II, 3\" AV Block, and Alarm Parameters. 1 [facility] MD contacted for concerns about elevated BP.  Willing to tolerate systolics of 074T    4022 Report to Urge

## 2022-11-23 NOTE — ADDENDUM NOTE
Addendum  created 11/23/22 1848 by Misty Naranjo CRNA    Flowsheet accepted, Intraprocedure Event edited, Intraprocedure Meds edited, SmartForm saved

## 2022-11-23 NOTE — PROCEDURES
Cardiac Procedure Note   Patient: Nino Kline  MRN: [id number]  SSN: [id number]   YOB: 1945 Age: 65 y.o.   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 Nessa Magaña MD    Assistant(s):  None  Anesthesia: Moderate Sedation by CRNA  Estimated Blood Loss: Less than 10 mL initially  Then 350 -400 cc pericardial effusion post drained when he had cardiac tamponade  Specimens Removed: None  Findings: difficult anatomy due to polio paralysis for years and atrophic legs  Vascular ultrasound guided for both femoral venous accesses  Significant scoliosis  Difficult to advance temp pacing catheter to RV apex  Difficult to get stiff wire to SVC  Difficult with AV micra implant, moved from apex to RV base septum  Heparin was given bolus during the procedure to prevent clots in delivery system  Complications: after all sheaths were removed and venous accesses closed  SBP 64 mmHg  I did bedside echo and noted small pericardial effusion at the RV apex about 1 cm but bp was low after dopamine had been turned off and he was fine at end of pacer implant  I proceed with echo and fluoro guided pericardiocentesis in EP lab  BP improved  Dr Ruslan Portillo from anesthesia placed arterial line  Then teresa drip was off  SBP dropped to 90 mmHg  I did echo again and small pericardial effusion residual in trace amount at apex and lateral wall of LV  30 mcg/min teresa added to 14 mcg/kg/min dopamine and    He woke up and felt fine  I spoke to Dr Hansa Fenton and Delvin of CT surgery in case he decompensates and needs pericardial window  Drain is in place but no more accumulation of blood in the bag  Will repeat echo in am  Implants:  AV leadless [employer] micra pacemaker  Signed by:  India Rubio MD  11/23/2022  6:57 PM

## 2022-11-23 NOTE — PROGRESS NOTES
TRANSFER - IN REPORT:    Verbal report received from Lexus HARVEY on Renetta Comes  being received from CCU for ordered procedure      Report consisted of patients Situation, Background, Assessment and   Recommendations(SBAR). Information from the following report(s) SBAR, Kardex, Intake/Output, and MAR was reviewed with the receiving nurse. Opportunity for questions and clarification was provided. Assessment completed upon patients arrival to unit and care assumed. Patient on Dopamine drip at 14 ml/hour on IV pump. Patient transported on oxygen at 3 Liters per minute. Transported from CCU to Cardiac cath. Lab in CCU bed on monitor by Kel Henderson RN and Lisa Rosen RN.

## 2022-11-23 NOTE — PROGRESS NOTES
[facility] EP Progress note     He is in 2: 1 AV block   On dopamine    Hx of syncope  Hx of atrophic legs, polio since 2 yo    Physical exam:  Visit Vitals  BP (!) 170/62 (BP 1 Location: Left arm, BP Patient Position: At rest)   Pulse (!) 42   Temp 98.7 °F (37.1 °C)   Resp 19   Wt 180 lb 12.4 oz (82 kg)   SpO2 96%   BMI 23.85 kg/m²       GENERAL: No acute distress  HEAD: no trauma  HEENT: Sclerae anicteric   CARDIOVASCULAR: bradycardic, no rubs/gallops 2/6 LSB diastolic murmur  RESPIRATORY: clear to auscultation bilaterally, without wheezes/rales/rhonchi  ABDOMINAL: soft, non-tender, mild distension  EXTREMITIES: warm atrophic legs  NEURO: alert, oriented, answering questions appropriately need to use hands to move lower legs  PSY: normal mood  Pulses femoral weak    Data: Labs, ECG, telemetry personally reviewed and interpreted    Current active problems:   1. intermittent complete heart block and 2-1 AV block Mobitz II  2. Syncope  3. Hypertension  4. Post polio syndrome with atrophic legs    Assessment and Plan:   He is on atenolol 75 mg daily in which his last dose was taken on 11/21/2020 2 in the morning. However he remains to be bradycardic with 2-1 AV block. Patient remains hemodynamically stable but need dopamine. LVEF of 55 to 60%, moderate aortic sclerosis with mild AI, mild TR  - He remains hemodynamically stable on dopamine drip  - Would recommend continue holding atenolol. However given the lack of improvement in his rhythm, he needs pacemaker implantation. He agrees with av micra  Ultrasound showed small right femoral vein      Thank you so much for the consultation. [x]    High complexity decision making was performed  [x]    Patient is at high-risk of decompensation with multiple organ involvement      ___________________________  Nickolas Walker M.D.  [facility]  Electrophysiology/Cardiology  [facility] [place]  [place] [address], [place] [address]                       Electrophysiology Note     []Initial Encounter     [x]Follow-up    Patient Name: Summer Santoyo - :1945 - BIV:[id number]  Primary Cardiologist: [facility] Physicians: Agustin Teran MD  Consulting Cardiologist: [facility] Physicians: Crystal Haddad MD     Reason for encounter: syncope; second degree type 2 heart block    HPI:       Summer Santoyo is a 65 y.o. male with PMH significant for HTN and post-polio syndrome. On  he was in the grocery store shopping when he had a syncopal episode. He was taken to [facility] where he was found to have second degree type 2 heart block. His home atenolol 75 mg daily which he takes for HTN has been held (last dose 22 AM). Labs were unremarkable. Echo today showed EF 55-60%, moderate aortic sclerosis with mild AI, and mild TR. He is currently on a dopamine gtt. He has been transferred to [address] [place] [address] for EP services and possible pacemaker. Subjective:      Summer Santoyo reports syncope yesterday. Prior syncopal episode several years ago. Assessment and Plan     Second degree type 2 heart block. - Likely cause of syncope 22.  - Continue to hold atenolol and all AV raven blocking agents. - Echo 22: EF 55-60%, moderate aortic sclerosis with mild AI, and mild TR.   - Allow beta blocker to wash out.   - Plan for pacemaker   Given post-polio syndrome and the necessity of using crutches will consider Micra AV leadless pacemaker as an alternate to traditional pacemaker. NPO after midnight. Hypertension.   - Remain off atenolol. Post-polio syndrome.   - LE weakness.  He uses canes to walk.        ____________________________________________________________    Cardiac testing  22    ECHO ADULT COMPLETE 11/22/2022 11/22/2022    Interpretation Summary    Left Ventricle: Normal left ventricular systolic function with a visually estimated EF of 55 - 60%. Left ventricle size is normal. Normal wall thickness. Normal wall motion. Grade I diastolic dysfunction with normal LAP. Aortic Valve: Moderately calcified cusp. Moderate sclerosis of the aortic valve cusp. Mild regurgitation. Mild stenosis of the aortic valve. Mitral Valve: Valve structure is normal. Mild annular calcification of the mitral valve. Pulmonary Arteries: Mild pulmonary hypertension present. Signed by: Pat Velazco MD on 11/22/2022  1:15 PM            ECG: second degree type 2 heart block    Review of Systems:    [x]All other systems reviewed and all negative except as written in HPI    [] Patient unable to provide secondary to condition    Past Medical History:   Diagnosis Date    Dizzy spells     Irregular heart beats      Past Surgical History:   Procedure Laterality Date    HX ORTHOPAEDIC  1959    Knee work      Social Hx:  reports that he has quit smoking. He does not have any smokeless tobacco history on file. He reports that he does not drink alcohol. Family Hx: family history includes Stroke in his mother. No Known Allergies       OBJECTIVE:  Wt Readings from Last 3 Encounters:   11/23/22 180 lb 12.4 oz (82 kg)   11/22/22 180 lb (81.6 kg)   01/08/16 203 lb (92.1 kg)       Intake/Output Summary (Last 24 hours) at 11/23/2022 1020  Last data filed at 11/23/2022 0800  Gross per 24 hour   Intake 1471.83 ml   Output 1700 ml   Net -228.17 ml         Physical Exam:    Vitals:   Vitals:    11/23/22 0500 11/23/22 0600 11/23/22 0700 11/23/22 0800   BP: (!) 157/63 (!) 164/62 (!) 150/55 (!) 170/62   Pulse: (!) 42 (!) 40 (!) 38 (!) 42   Resp: 20 24 17 19   Temp:    98.7 °F (37.1 °C)   SpO2: 93% 97% 98% 96%   Weight:         Telemetry: 2:1 AV block    Gen: No acute distress.    Neck: Supple, No JVD, No Carotid Bruit  Resp: No accessory muscle use, Clear breath sounds, No rales or rhonchi  Card:  2/6 systolic murmur  Abd:   Soft, non-tender, non-distended   MSK: Alonzo LE weakness and deformity 2/2 polio. Alonzo UE without abnormalities. Skin: No rashes    Neuro: Alert, oriented. Psych: Good insight, oriented to person, place, alert, Nml Affect  LE: Alonzo trace pretibial edema    Data Review:     Radiology:   XR Results (most recent):  Results from Hospital Encounter encounter on 11/21/22    XR CHEST PORT    Narrative  INDICATION:  Syncope    COMPARISON: None    FINDINGS: Single AP portable view of the chest obtained at 1726 demonstrates an  enlarged cardiac silhouette. The lungs are clear bilaterally. No osseous  abnormalities are seen. Impression  Cardiomegaly. No focal pulmonary process. Recent Labs     11/23/22  0358 11/21/22  1653    134*   K 4.4 4.1    97*   CO2 24 22   BUN 32* 24*   CREA 1.03 0.93   * 125*   CA 8.6 8.9       Recent Labs     11/23/22  0358 11/21/22  1653   WBC 16.8* 12.0*   HGB 14.8 15.4   HCT 43.1 45.8    224       Recent Labs     11/21/22  1653          No results for input(s): CHOL, LDLC in the last 72 hours.     No lab exists for component: TGL, HDLC,  HBA1C      Current meds:    Current Facility-Administered Medications:     sodium chloride (NS) flush 5-40 mL, 5-40 mL, IntraVENous, Q8H, Coretta Powers, NP    sodium chloride (NS) flush 5-40 mL, 5-40 mL, IntraVENous, PRN, Ginette NIEVES NP    ceFAZolin (ANCEF) 2 g in sterile water (preservative free) 20 mL IV syringe, 2 g, IntraVENous, ONCE, Ginette NIEVES NP    sodium chloride (NS) flush 5-40 mL, 5-40 mL, IntraVENous, Q8H, Romelia Olson MD, 10 mL at 11/23/22 0544    sodium chloride (NS) flush 5-40 mL, 5-40 mL, IntraVENous, PRN, Romelia Olson MD    acetaminophen (TYLENOL) tablet 650 mg, 650 mg, Oral, Q6H PRN **OR** acetaminophen (TYLENOL) suppository 650 mg, 650 mg, Rectal, Q6H PRN, Romelia Olson MD polyethylene glycol (MIRALAX) packet 17 g, 17 g, Oral, DAILY PRN, Pepper Chamberlain MD    ondansetron (ZOFRAN ODT) tablet 4 mg, 4 mg, Oral, Q8H PRN **OR** ondansetron (ZOFRAN) injection 4 mg, 4 mg, IntraVENous, Q6H PRN, Pepper Chamberlain MD    [Held by provider] enoxaparin (LOVENOX) injection 40 mg, 40 mg, SubCUTAneous, DAILY, Pepper Chamberlain MD    DOPamine (INTROPIN) 800 mg in dextrose 5% 250 mL infusion, 0-20 mcg/kg/min, IntraVENous, TITRATE, Pepper Chamberlain MD, Last Rate: 22.2 mL/hr at 11/23/22 0221, 14 mcg/kg/min at 11/23/22 0221    lactated Ringers infusion, 50 mL/hr, IntraVENous, CONTINUOUS, Pepper Chamberlain MD, Last Rate: 50 mL/hr at 11/23/22 0106, 50 mL/hr at 11/23/22 0106    Paul Colin MD    [facility] Cardiology  Call center: W) [phone] (x) [phone]      CC: Other, MD Jose

## 2022-11-23 NOTE — PROGRESS NOTES
0730 Bedside and Verbal shift change report given to Lexus RN (oncoming nurse) by Skyler Portillo RN (offgoing nurse). Report included the following information SBAR, Kardex, Procedure Summary, Intake/Output, MAR, Recent Results, and Cardiac Rhythm 2AVB Type II .   1520 TRANSFER - OUT REPORT:    Verbal report given to Bettie Edward RN(name) on [facility]  being transferred to [facility]unit) for ordered procedure       Report consisted of patients Situation, Background, Assessment and   Recommendations(SBAR). Information from the following report(s) SBAR, Intake/Output, MAR, Recent Results, and Cardiac Rhythm 3AVB  was reviewed with the receiving nurse. Opportunity for questions and clarification was provided. Patient transported with:   Monitor  O2 @ 3 liters  Patient-specific medications from Pharmacy  Registered Nurse    TAMIKO Pennington 21 - IN REPORT:    Verbal report received from [facility]name) on [facility] Pac  being received from [facility]unit) for routine post - op      Report consisted of patients Situation, Background, Assessment and   Recommendations(SBAR). Information from the following report(s) SBAR, Kardex, Procedure Summary, Intake/Output, MAR, Recent Results, and Cardiac Rhythm NSR  was reviewed with the receiving nurse. Opportunity for questions and clarification was provided. Assessment completed upon patients arrival to unit and care assumed. STAT CBC, type & sceen & 500mL LR bolus per orders from Dr. Eunice Lopez w/ Dr. Edilma Wilder - orders received for NS @ 125mL/hr  1905 Weaned dopamine gtt off - MAP dropped to 44 - dopamine gtt restarted and levophed gtt increased  Dr. Edilma Wilder at bedside - updating pt on status - orders received to wean teresa gtt off first & then dopamine gtt if able  1930 Bedside and Verbal shift change report given to Dick Nowak RN (oncoming nurse) by Lavon Castellano RN (offgoing nurse).  Report included the following information SBAR, Kardex, Procedure Summary, Intake/Output, MAR, Recent Results, and Cardiac Rhythm NSR .

## 2022-11-23 NOTE — PROGRESS NOTES
Cardiac Cath Lab Procedure Area Arrival Note:    Ector Andino arrived to Cardiac Cath Lab, Procedure Area. Patient identifiers verified with NAME and DATE OF BIRTH. Procedure verified with patient. Consent forms verified. Allergies verified. Patient informed of procedure and plan of care. Questions answered with review. Patient voiced understanding of procedure and plan of care. Patient on cardiac monitor, non-invasive blood pressure, SPO2 monitor. On  O2 @ 1 lpm via nc. IV of ns on pump at 25 ml/hr. Patient status doing well without problems. Patient is A&Ox 4. Patient reports no pain. Patient medicated during procedure with orders obtained and verified by Dr. Zi Gordon. Refer to patients Cardiac Cath Lab PROCEDURE REPORT for vital signs, assessment, status, and response during procedure, printed at end of case. Printed report on chart or scanned into chart.

## 2022-11-23 NOTE — ANESTHESIA PREPROCEDURE EVALUATION
Relevant Problems   CARDIOVASCULAR   (+) HTN (hypertension)   (+) Heart block       Anesthetic History   No history of anesthetic complications            Review of Systems / Medical History  Patient summary reviewed, nursing notes reviewed and pertinent labs reviewed    Pulmonary  Within defined limits                 Neuro/Psych   Within defined limits           Cardiovascular  Within defined limits  Hypertension        Dysrhythmias            GI/Hepatic/Renal  Within defined limits              Endo/Other  Within defined limits           Other Findings              Physical Exam    Airway  Mallampati: II  TM Distance: > 6 cm  Neck ROM: normal range of motion   Mouth opening: Normal     Cardiovascular  Regular rate and rhythm,  S1 and S2 normal,  no murmur, click, rub, or gallop             Dental  No notable dental hx       Pulmonary  Breath sounds clear to auscultation               Abdominal  GI exam deferred       Other Findings            Anesthetic Plan    ASA: 2  Anesthesia type: MAC          Induction: Intravenous  Anesthetic plan and risks discussed with: Patient

## 2022-11-23 NOTE — PROGRESS NOTES
WILLIS: Anticipate discharge home pending medical progress. Transportation likely in [employer]. Patient will need assistance with discharge transportation. Reason for Admission:  heart block--pacemaker placement planned for today                   RUR Score:   11%                  Plan for utilizing home health:  N/A        PCP: First and Last name:  Jose Cochran MD     Name of Practice: [facility]   Are you a current patient: Yes/No:    Approximate date of last visit: may 2022   Can you participate in a virtual visit with your PCP:                     Current Advanced Directive/Advance Care Plan: Full Code      Healthcare Decision Maker:   Click here to complete [place] including selection of the Healthcare Decision Maker Relationship (ie \"Primary\")                       Transition of Care Plan:  CM met with patient at bedside. Patient is alert and oriented x4. Demographics confirmed. Patient lives alone in a split level home and must climb about 15 steps to his bedroom. There are two steps to enter the home. DME: crutches, leg brace, shower chair. Patient is independent in ADLs and drives. Hx: childhood polio, partial paralysis. PCP confirmed and pharmacy used is [facility]. Patient will most likely need assistance with transportation at discharge as his sisters live in [place] [address] and he does not have access to the [employer] apps. Medicare pt has received, reviewed, and signed IM letter informing them of their right to appeal the discharge. Signed copied has been placed on pt bedside chart. Care Management Interventions  PCP Verified by CM: Yes ([facility] )  Mode of Transport at Discharge:  Other (see comment) (will need assistance with transportation)  MyChart Signup: No  Discharge Durable Medical Equipment: No  Physical Therapy Consult: No  Occupational Therapy Consult: No  Speech Therapy Consult: No  Support Systems: Other Family Member(s)  Confirm Follow Up Transport: Self  The Plan for Transition of Care is Related to the Following Treatment Goals : home  Discharge Location  Patient Expects to be Discharged to[de-identified] [address] [address] [place] [address], [facility] A [facility],[address] [place] [address]  [phone]

## 2022-11-23 NOTE — ANESTHESIA PROCEDURE NOTES
Arterial Line Placement    Start time: 11/23/2022 3:45 PM  End time: 11/23/2022 3:50 PM  Performed by: Nikolas Gould CRNA  Authorized by: Frank Shore MD     Pre-Procedure  Indications:  Arterial pressure monitoring  Preanesthetic Checklist: patient identified, risks and benefits discussed, anesthesia consent, site marked, patient being monitored, timeout performed, patient being monitored and fire risk safety assessment completed and verbalized    Timeout Time: 15:54 EST      Procedure:   Prep:  Chlorhexidine  Seldinger Technique?: Yes    Orientation:  Right  Location:  Radial artery  Catheter size:  20 G  Number of attempts:  1    Assessment:   Post-procedure:  Line secured  Patient Tolerance:  Patient tolerated the procedure well with no immediate complications

## 2022-11-23 NOTE — PROGRESS NOTES
TRANSFER - OUT REPORT:    Verbal report given to Lexus on Ernestina Mckinley being transferred to CCU for routine progression of care       Report consisted of patients Situation, Background, Assessment and   Recommendations(SBAR). Information from the following report(s) SBAR, Procedure Summary, and MAR was reviewed with the receiving nurse. Opportunity for questions and clarification was provided.

## 2022-11-23 NOTE — ANESTHESIA POSTPROCEDURE EVALUATION
Procedure(s):  INSERT OR REPLACE TRANSCATH PPM LEADLESS  PERICARDIOCENTESIS.    general    Anesthesia Post Evaluation        Patient location during evaluation: PACU  Patient participation: complete - patient participated  Level of consciousness: awake and alert  Pain management: adequate  Airway patency: patent  Anesthetic complications: no  Cardiovascular status: acceptable  Respiratory status: acceptable  Hydration status: acceptable  Comments: I have seen and evaluated the patient and is ready for discharge. Cecile Montgomery MD    Post anesthesia nausea and vomiting:  none      INITIAL Post-op Vital signs: No vitals data found for the desired time range.

## 2022-11-23 NOTE — DISCHARGE INSTRUCTIONS
PATIENT INSTRUCTIONS POST-LEADLESS PACEMAKER IMPLANT    1. No heavy lifting or strenuous exercise x 1 week. 2.  Your groin site suture should be removed prior to leaving the hospital.  If this is still in place, please call [phone]. You may shower. 3.  Do not drive for 2 days. 4.  Call Dr. Miguelina Torres at [phone] if you experience any of the following symptoms:  1. Redness at the groin site  2. Swelling at or around the groin  3. Pain around the groin  4. Dizziness, lightheadedness, fainting spells  5. Lack of energy  6. Shortness of breath  7. Rapid heart rate  8. Chest or muscle twitches    5. Follow-up with Dr. Shaw Schroeder office as scheduled below. Future Appointments   Date Time Provider William Ren   12/7/2022 11:00 AM PACEMAKER3, Sukh Luther BS AMB   12/7/2022 11:20 AM GHASSAN Camarena BS AMB   2/27/2023  3:30 PM REMOTE1, KRISTINA BAILEY BS AMB     6. You may use over the counter pain medication (Tylenol) and ice pack for pain relief as needed. You may wear the sling as a reminder to keep your arm below the your shoulder. Brandon Drew M.D. [facility] [place]  Electrophysiology/Cardiology  [facility] [place]  [facility] [address], [address]  [phone]         Discharge Instructions       PATIENT ID: Michelle Owen  MRN: [id number]   YOB: 1945    DATE OF ADMISSION: [unfilled]    DATE OF DISCHARGE: 11/26/2022    PRIMARY CARE PROVIDER: @PCP@     ATTENDING PHYSICIAN: [unfilled]  DISCHARGING PROVIDER: Marti Galdamez MD    To contact this individual call [phone] and ask the  to page. If unavailable ask to be transferred the Adult Hospitalist Department.     DISCHARGE DIAGNOSES Syncope    CONSULTATIONS: Cardiology    PROCEDURES/SURGERIES: Procedure(s):  INSERT OR REPLACE TRANSCATH PPM LEADLESS  PERICARDIOCENTESIS    PENDING TEST RESULTS:   At the time of discharge the following test results are still pending: none    FOLLOW UP APPOINTMENTS:   PCP  Cardiology    ADDITIONAL CARE RECOMMENDATIONS: as above    DIET: Cardiac Diet    ACTIVITY: Activity as tolerated      DISCHARGE MEDICATIONS:   See Medication Reconciliation Form    It is important that you take the medication exactly as they are prescribed. Keep your medication in the bottles provided by the pharmacist and keep a list of the medication names, dosages, and times to be taken in your wallet. Do not take other medications without consulting your doctor. NOTIFY YOUR PHYSICIAN FOR ANY OF THE FOLLOWING:   Fever over 101 degrees for 24 hours. Chest pain, shortness of breath, fever, chills, nausea, vomiting, diarrhea, change in mentation, falling, weakness, bleeding. Severe pain or pain not relieved by medications. Or, any other signs or symptoms that you may have questions about.       DISPOSITION:   x Home With:   OT  PT  HH  RN       SNF/Inpatient Rehab/LTAC    Independent/assisted living    Hospice    Other:     CDMP Checked:   Yes x     PROBLEM LIST Updated:  Yes x       Signed:   Reuben Webb MD  11/26/2022  1:01 PM

## 2022-11-24 ENCOUNTER — APPOINTMENT (OUTPATIENT)
Dept: NON INVASIVE DIAGNOSTICS | Age: 77
DRG: 229 | End: 2022-11-24
Attending: INTERNAL MEDICINE
Payer: MEDICARE

## 2022-11-24 LAB
ANION GAP SERPL CALC-SCNC: 6 MMOL/L (ref 5–15)
BUN SERPL-MCNC: 29 MG/DL (ref 6–20)
BUN/CREAT SERPL: 50 (ref 12–20)
CALCIUM SERPL-MCNC: 7.4 MG/DL (ref 8.5–10.1)
CHLORIDE SERPL-SCNC: 107 MMOL/L (ref 97–108)
CO2 SERPL-SCNC: 25 MMOL/L (ref 21–32)
CREAT SERPL-MCNC: 0.58 MG/DL (ref 0.7–1.3)
ERYTHROCYTE [DISTWIDTH] IN BLOOD BY AUTOMATED COUNT: 12.8 % (ref 11.5–14.5)
GLUCOSE SERPL-MCNC: 114 MG/DL (ref 65–100)
HCT VFR BLD AUTO: 33.4 % (ref 36.6–50.3)
HGB BLD-MCNC: 11.6 G/DL (ref 12.1–17)
MCH RBC QN AUTO: 33.4 PG (ref 26–34)
MCHC RBC AUTO-ENTMCNC: 34.7 G/DL (ref 30–36.5)
MCV RBC AUTO: 96.3 FL (ref 80–99)
NRBC # BLD: 0 K/UL (ref 0–0.01)
NRBC BLD-RTO: 0 PER 100 WBC
PLATELET # BLD AUTO: 150 K/UL (ref 150–400)
PMV BLD AUTO: 11 FL (ref 8.9–12.9)
POTASSIUM SERPL-SCNC: 4.1 MMOL/L (ref 3.5–5.1)
RBC # BLD AUTO: 3.47 M/UL (ref 4.1–5.7)
SODIUM SERPL-SCNC: 138 MMOL/L (ref 136–145)
WBC # BLD AUTO: 11.2 K/UL (ref 4.1–11.1)

## 2022-11-24 PROCEDURE — 80048 BASIC METABOLIC PNL TOTAL CA: CPT

## 2022-11-24 PROCEDURE — 85027 COMPLETE CBC AUTOMATED: CPT

## 2022-11-24 PROCEDURE — 74011250636 HC RX REV CODE- 250/636: Performed by: INTERNAL MEDICINE

## 2022-11-24 PROCEDURE — 93325 DOPPLER ECHO COLOR FLOW MAPG: CPT | Performed by: INTERNAL MEDICINE

## 2022-11-24 PROCEDURE — 74011000250 HC RX REV CODE- 250: Performed by: INTERNAL MEDICINE

## 2022-11-24 PROCEDURE — 74011250637 HC RX REV CODE- 250/637: Performed by: INTERNAL MEDICINE

## 2022-11-24 PROCEDURE — 65620000000 HC RM CCU GENERAL

## 2022-11-24 PROCEDURE — 99024 POSTOP FOLLOW-UP VISIT: CPT | Performed by: INTERNAL MEDICINE

## 2022-11-24 PROCEDURE — 36415 COLL VENOUS BLD VENIPUNCTURE: CPT

## 2022-11-24 PROCEDURE — 93308 TTE F-UP OR LMTD: CPT | Performed by: INTERNAL MEDICINE

## 2022-11-24 PROCEDURE — 93308 TTE F-UP OR LMTD: CPT

## 2022-11-24 RX ADMIN — SODIUM CHLORIDE, PRESERVATIVE FREE 10 ML: 5 INJECTION INTRAVENOUS at 14:00

## 2022-11-24 RX ADMIN — SODIUM CHLORIDE, POTASSIUM CHLORIDE, SODIUM LACTATE AND CALCIUM CHLORIDE 100 ML/HR: 600; 310; 30; 20 INJECTION, SOLUTION INTRAVENOUS at 06:20

## 2022-11-24 RX ADMIN — PHENYLEPHRINE HYDROCHLORIDE 30 MCG/MIN: 10 INJECTION INTRAVENOUS at 09:27

## 2022-11-24 RX ADMIN — SODIUM CHLORIDE, PRESERVATIVE FREE 10 ML: 5 INJECTION INTRAVENOUS at 21:32

## 2022-11-24 RX ADMIN — CEPHALEXIN 500 MG: 500 CAPSULE ORAL at 16:30

## 2022-11-24 RX ADMIN — PHENYLEPHRINE HYDROCHLORIDE 15 MCG/MIN: 10 INJECTION INTRAVENOUS at 20:09

## 2022-11-24 RX ADMIN — CEPHALEXIN 500 MG: 500 CAPSULE ORAL at 08:09

## 2022-11-24 RX ADMIN — CEPHALEXIN 500 MG: 500 CAPSULE ORAL at 21:32

## 2022-11-24 NOTE — PROGRESS NOTES
1930: Bedside and Verbal shift change report given to Lorraine Burgos RN (oncoming nurse) by Kam Adame RN (offgoing nurse). Report included the following information SBAR, Kardex, ED Summary, OR Summary, Procedure Summary, Intake/Output, MAR, Recent Results, Cardiac Rhythm SR, and Alarm Parameters . 1945: Lines and gtts verified. 2330: Akash gtt weaned off.    8085: Pacemaker interrogated. 0700: No output from pericardial drain all night. 0730: Bedside and Verbal shift change report given to Michelle Galicia RN (oncoming nurse) by Lorraine Burgos RN (offgoing nurse). Report included the following information SBAR, Kardex, ED Summary, OR Summary, Procedure Summary, Intake/Output, MAR, Recent Results, Cardiac Rhythm SR/v-paced, and Alarm Parameters .

## 2022-11-24 NOTE — PROGRESS NOTES
0730: Bedside shift change report given to Clare Min RN (oncoming nurse) by Dick Nowak RN (offgoing nurse). Report included the following information SBAR, Kardex, ED Summary, OR Summary, Procedure Summary, Intake/Output, MAR, Recent Results, Cardiac Rhythm V-Paced, and Dual Neuro Assessment. 4948: Dopamine gtt off; Akash restarted per Dr Shayne Comer. 1100: Dr Zuleyma Glasgow @ the bedside to remove pericardial drain from pt.     1930: Bedside shift change report given to Dick Nowak RN (oncoming nurse) by Clare Min RN (offgoing nurse). Report included the following information SBAR, Kardex, ED Summary, OR Summary, Procedure Summary, MAR, Recent Results, Cardiac Rhythm V-Paced, and Dual Neuro Assessment.

## 2022-11-24 NOTE — PROGRESS NOTES
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 He returned to CCU on phenylephrine 100 mcg/min and dopamine 7 mcg/kg/min. His BP actually tending to be high when he has his native sinus rhythm but drops when he is paced. I have instructed RN to turn dopamine to 10 mcg/kg/min and wean phenylephrine to off. Stat CBC and T&C ordered.      Sammi Mendez, [address] [facility]  [phone]  11/23/2022 7:50 PM

## 2022-11-24 NOTE — PROGRESS NOTES
Assumed care of pt at this time.     Leadless pacemaker check by Trav hall this morning showed normal function

## 2022-11-24 NOTE — PROGRESS NOTES
[facility] CARDIAC Progress note      Hx of syncope  Hx of atrophic legs, polio since 2 yo    S/p leadless pacemaker. No chest pain. Breathing ok. No significant drainage from drain. Echo pending    Physical exam:  Visit Vitals  BP (!) 104/51   Pulse 72   Temp 98 °F (36.7 °C)   Resp 24   Wt 196 lb 3.4 oz (89 kg)   SpO2 94%   BMI 25.89 kg/m²       GENERAL: No acute distress  HEAD: no trauma  HEENT: Sclerae anicteric   CARDIOVASCULAR: paced, no rubs/gallops 2/6 LSB diastolic murmur  RESPIRATORY: clear to auscultation bilaterally, without wheezes/rales/rhonchi  ABDOMINAL: soft, non-tender, mild distension  EXTREMITIES: warm atrophic legs  NEURO: alert, oriented, answering questions appropriately need to use hands to move lower legs  PSY: normal mood  Pulses femoral weak    Data: Labs, ECG, telemetry personally reviewed and interpreted    Current active problems:   1. intermittent complete heart block and 2-1 AV block Mobitz II s/p leadless pacemaker  2. Syncope  3. 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 If echo this AM is ok, will remove pericardial drain. LVEF of 55 to 60%, moderate aortic sclerosis with mild AI, mild TR        [x]    High complexity decision making was performed  [x]    Patient is at high-risk of decompensation with multiple organ involvement                                 Cardiac testing  11/21/22    ECHO ADULT COMPLETE 11/22/2022 11/22/2022    Interpretation Summary    Left Ventricle: Normal left ventricular systolic function with a visually estimated EF of 55 - 60%. Left ventricle size is normal. Normal wall thickness. Normal wall motion. Grade I diastolic dysfunction with normal LAP. Aortic Valve: Moderately calcified cusp. Moderate sclerosis of the aortic valve cusp. Mild regurgitation. Mild stenosis of the aortic valve.     Mitral Valve: Valve structure is normal. Mild annular calcification of the mitral valve.    Pulmonary Arteries: Mild pulmonary hypertension present. Signed by: Sarabjit Sheets MD on 11/22/2022  1:15 PM            ECG: second degree type 2 heart block    Review of Systems:    [x]All other systems reviewed and all negative except as written in HPI    [] Patient unable to provide secondary to condition    Past Medical History:   Diagnosis Date    Dizzy spells     Irregular heart beats      Past Surgical History:   Procedure Laterality Date    HX ORTHOPAEDIC  1959    Knee work      Social Hx:  reports that he has quit smoking. He does not have any smokeless tobacco history on file. He reports that he does not drink alcohol. Family Hx: family history includes Stroke in his mother. No Known Allergies       OBJECTIVE:  Wt Readings from Last 3 Encounters:   11/24/22 196 lb 3.4 oz (89 kg)   11/22/22 180 lb (81.6 kg)   01/08/16 203 lb (92.1 kg)       Intake/Output Summary (Last 24 hours) at 11/24/2022 0928  Last data filed at 11/24/2022 0900  Gross per 24 hour   Intake 3538.07 ml   Output 1425 ml   Net 2113.07 ml         Physical Exam:    Vitals:   Vitals:    11/24/22 0600 11/24/22 0700 11/24/22 0800 11/24/22 0900   BP: 129/62 (!) 119/59 (!) 116/55 (!) 104/51   Pulse: 68 66 76 72   Resp: 22 23 30 24   Temp:   98 °F (36.7 °C)    SpO2: 95% 95% 95% 94%   Weight:             Recent Labs     11/24/22  0408 11/23/22  0358    137   K 4.1 4.4    104   CO2 25 24   BUN 29* 32*   CREA 0.58* 1.03   * 140*   CA 7.4* 8.6       Recent Labs     11/24/22  0408 11/23/22  1852   WBC 11.2* 17.7*   HGB 11.6* 13.6   HCT 33.4* 39.9    185       Recent Labs     11/21/22  1653          No results for input(s): CHOL, LDLC in the last 72 hours.     No lab exists for component: TGL, HDLC,  HBA1C      Current meds:    Current Facility-Administered Medications:     PHENYLephrine (PIERO-SYNEPHRINE) 30 mg in 0.9% sodium chloride 250 mL infusion,  mcg/min, IntraVENous, TITRATE, Gelacio Morrison MD, Stopped at 11/23/22 2330    cephALEXin (KEFLEX) capsule 500 mg, 500 mg, Oral, TID, Jefe Galvez MD, 500 mg at 11/24/22 0809    sodium chloride (NS) flush 5-40 mL, 5-40 mL, IntraVENous, Q8H, Joann Llanes MD, 10 mL at 11/23/22 2143    sodium chloride (NS) flush 5-40 mL, 5-40 mL, IntraVENous, PRN, Joann Llanes MD    acetaminophen (TYLENOL) tablet 650 mg, 650 mg, Oral, Q6H PRN **OR** acetaminophen (TYLENOL) suppository 650 mg, 650 mg, Rectal, Q6H PRN, Joann Llanes MD    polyethylene glycol (MIRALAX) packet 17 g, 17 g, Oral, DAILY PRN, Joann Llanes MD    ondansetron (ZOFRAN ODT) tablet 4 mg, 4 mg, Oral, Q8H PRN **OR** ondansetron (ZOFRAN) injection 4 mg, 4 mg, IntraVENous, Q6H PRN, Joann Llanes MD    DOPamine (INTROPIN) 800 mg in dextrose 5% 250 mL infusion, 0-20 mcg/kg/min, IntraVENous, TITRATE, Joann Llanes MD, Last Rate: 9.5 mL/hr at 11/24/22 0809, 6 mcg/kg/min at 11/24/22 0809    lactated Ringers infusion, 100 mL/hr, IntraVENous, CONTINUOUS, Joann Llanes MD, Last Rate: 100 mL/hr at 11/24/22 0620, 100 mL/hr at 11/24/22 Mata Faulkner MD    [facility] Cardiology  Call center: (G) [phone]  (Y) [phone]      CC: Other, MD Jose

## 2022-11-24 NOTE — PROGRESS NOTES
[facility]    ICU TEAM Progress Note    Name: Martita Santos   : 1945   MRN: [id number]   Date: 2022      Assessment and Plan:     ICU Problems:  Bradycardia   High degree heart block        Briefly, 65 M with post polio syndrome and hypertension presented to [facility]  after syncope episode and found to have Mobitz II heart block. It was noted that he takes atenolol for hypertension. He was monitored overnight. Echocardiogram revealed normal LV function. Heart block persisted into morning of  and he was transferred to [facility] for possible PPM placement. Underwent leadless pacemaker placement on , which was complicated by pericardial effusion requiring pericardiocentesis and drain placement with removal of 300-400 cc of blood. Transferred back to the ICU on dopamine and phenylephrine infusions.    -Repeat TTE this morning did not show any pericardial fusion. Therefore the plan is for pericardial drain removal.    -Wean phenylephrine for maps more than 65. POD:  Day of Surgery    S/P:   Procedure(s):  INSERT PPM DUAL    Active Problem List:     Problem List  Date Reviewed: 2022            Codes Class    Heart block ICD-10-CM: I45.9  ICD-9-CM: 426.9         Symptomatic bradycardia ICD-10-CM: R00.1  ICD-9-CM: 427.89         Syncope ICD-10-CM: R55  ICD-9-CM: 780.2         H/O atrial tachycardia ICD-10-CM: Z86.79  ICD-9-CM: V12.59         HTN (hypertension) ICD-10-CM: I10  ICD-9-CM: 401.9         Polio ICD-10-CM: A80.9  ICD-9-CM: 045.90          Past Medical History:      has a past medical history of Dizzy spells and Irregular heart beats. Past Surgical History:      has a past surgical history that includes hx orthopaedic (). Home Medications:     Prior to Admission medications    Medication Sig Start Date End Date Taking? Authorizing Provider   atenoloL (TENORMIN) 50 mg tablet Take 75 mg by mouth daily.    Yes Provider, Historical       Allergies/Social/Family History:     No Known Allergies   Social History     Tobacco Use    Smoking status: Former    Smokeless tobacco: Not on file   Substance Use Topics    Alcohol use: No     Alcohol/week: 0.0 standard drinks      Family History   Problem Relation Age of Onset    Stroke Mother          Objective:   Vital Signs:  Visit Vitals  BP (!) 85/40   Pulse 75   Temp 98 °F (36.7 °C)   Resp 22   Ht 6' 1\" (1.854 m)   Wt 89 kg (196 lb 3.4 oz)   SpO2 96%   BMI 25.89 kg/m²    O2 Flow Rate (L/min): 2 l/min O2 Device: Nasal cannula Temp (24hrs), Av °F (36.7 °C), Min:97.6 °F (36.4 °C), Max:98.2 °F (36.8 °C)           Intake/Output:     Intake/Output Summary (Last 24 hours) at 2022 1255  Last data filed at 2022 1000  Gross per 24 hour   Intake 3434.42 ml   Output 1175 ml   Net 2259.42 ml         Physical Exam:    Gen:  NAD  Neuro:  CH II-XII intact, no focal deficits, fully oriented  Resp:  CTAB  CV:  Doyle  Abd soft  Extremities:  No edema    LABS AND  DATA: Personally reviewed  Recent Labs     22  0408 22  1852   WBC 11.2* 17.7*   HGB 11.6* 13.6   HCT 33.4* 39.9    185       Recent Labs     22  0408 22  0358 22  1653    137 134*   K 4.1 4.4 4.1    104 97*   CO2    BUN 29* 32* 24*   CREA 0.58* 1.03 0.93   * 140* 125*   CA 7.4* 8.6 8.9   MG  --   --  1.8       Recent Labs     22  1653      TP 6.6   ALB 3.6   GLOB 3.0       No results for input(s): INR, PTP, APTT, INREXT, INREXT in the last 72 hours. No results for input(s): PHI, PCO2I, PO2I, FIO2I in the last 72 hours. No results for input(s): CPK, CKMB, TROIQ, BNPP in the last 72 hours.     Hemodynamics:   PAP:   CO:     Wedge:   CI:     CVP:    SVR:       PVR:       Ventilator Settings:  Mode Rate Tidal Volume Pressure FiO2 PEEP                    Peak airway pressure:      Minute ventilation:          MEDS: Reviewed    Chest X-Ray:  CXR Results  (Last 48 hours)      None              ECHO: Left Ventricle: Normal left ventricular systolic function with a visually estimated EF of 55 - 60%. Left ventricle size is normal. Normal wall thickness. Normal wall motion. Grade I diastolic dysfunction with normal LAP. Aortic Valve: Moderately calcified cusp. Moderate sclerosis of the aortic valve cusp. Mild regurgitation. Mild stenosis of the aortic valve. Mitral Valve: Valve structure is normal. Mild annular calcification of the mitral valve. Pulmonary Arteries: Mild pulmonary hypertension present. SPECIAL EQUIPMENT  None    DISPOSITION  Stay in ICU    CRITICAL CARE CONSULTANT NOTE  I had a face to face encounter with the patient, reviewed and interpreted patient data including clinical events, labs, images, vital signs, I/O's, and examined patient. I have discussed the case and the plan and management of the patient's care with the consulting services, the bedside nurses and the respiratory therapist.      NOTE OF PERSONAL INVOLVEMENT IN CARE   This patient has a high probability of imminent, clinically significant deterioration, which requires the highest level of preparedness to intervene urgently. I participated in the decision-making and personally managed or directed the management of the following life and organ supporting interventions that required my frequent assessment to treat or prevent imminent deterioration. I personally spent 40 minutes of critical care time. This is time spent at this critically ill patient's bedside actively involved in patient care as well as the coordination of care and discussions with the patient's family. This does not include any procedural time which has been billed separately.       [address] [place] [address] Physicians

## 2022-11-25 LAB
ALBUMIN SERPL-MCNC: 2.1 G/DL (ref 3.5–5)
ALBUMIN/GLOB SERPL: 0.9 {RATIO} (ref 1.1–2.2)
ALP SERPL-CCNC: 60 U/L (ref 45–117)
ALT SERPL-CCNC: 22 U/L (ref 12–78)
ANION GAP SERPL CALC-SCNC: 6 MMOL/L (ref 5–15)
AST SERPL-CCNC: 20 U/L (ref 15–37)
ATRIAL RATE: 71 BPM
ATRIAL RATE: 84 BPM
BILIRUB SERPL-MCNC: 0.9 MG/DL (ref 0.2–1)
BUN SERPL-MCNC: 22 MG/DL (ref 6–20)
BUN/CREAT SERPL: 34 (ref 12–20)
CALCIUM SERPL-MCNC: 7.5 MG/DL (ref 8.5–10.1)
CALCULATED P AXIS, ECG09: 26 DEGREES
CALCULATED P AXIS, ECG09: 27 DEGREES
CALCULATED R AXIS, ECG10: -2 DEGREES
CALCULATED R AXIS, ECG10: -2 DEGREES
CALCULATED T AXIS, ECG11: 13 DEGREES
CALCULATED T AXIS, ECG11: 35 DEGREES
CHLORIDE SERPL-SCNC: 108 MMOL/L (ref 97–108)
CO2 SERPL-SCNC: 25 MMOL/L (ref 21–32)
CREAT SERPL-MCNC: 0.65 MG/DL (ref 0.7–1.3)
DIAGNOSIS, 93000: NORMAL
DIAGNOSIS, 93000: NORMAL
ERYTHROCYTE [DISTWIDTH] IN BLOOD BY AUTOMATED COUNT: 12.9 % (ref 11.5–14.5)
GLOBULIN SER CALC-MCNC: 2.3 G/DL (ref 2–4)
GLUCOSE SERPL-MCNC: 94 MG/DL (ref 65–100)
HCT VFR BLD AUTO: 31.4 % (ref 36.6–50.3)
HGB BLD-MCNC: 10.8 G/DL (ref 12.1–17)
MAGNESIUM SERPL-MCNC: 2 MG/DL (ref 1.6–2.4)
MCH RBC QN AUTO: 33.4 PG (ref 26–34)
MCHC RBC AUTO-ENTMCNC: 34.4 G/DL (ref 30–36.5)
MCV RBC AUTO: 97.2 FL (ref 80–99)
NRBC # BLD: 0 K/UL (ref 0–0.01)
NRBC BLD-RTO: 0 PER 100 WBC
P-R INTERVAL, ECG05: 154 MS
P-R INTERVAL, ECG05: 236 MS
PLATELET # BLD AUTO: 142 K/UL (ref 150–400)
PMV BLD AUTO: 10.7 FL (ref 8.9–12.9)
POTASSIUM SERPL-SCNC: 4.2 MMOL/L (ref 3.5–5.1)
PROT SERPL-MCNC: 4.4 G/DL (ref 6.4–8.2)
Q-T INTERVAL, ECG07: 410 MS
Q-T INTERVAL, ECG07: 462 MS
QRS DURATION, ECG06: 78 MS
QRS DURATION, ECG06: 80 MS
QTC CALCULATION (BEZET), ECG08: 385 MS
QTC CALCULATION (BEZET), ECG08: 445 MS
RBC # BLD AUTO: 3.23 M/UL (ref 4.1–5.7)
SODIUM SERPL-SCNC: 139 MMOL/L (ref 136–145)
VENTRICULAR RATE, ECG03: 42 BPM
VENTRICULAR RATE, ECG03: 71 BPM
WBC # BLD AUTO: 8.8 K/UL (ref 4.1–11.1)

## 2022-11-25 PROCEDURE — 36415 COLL VENOUS BLD VENIPUNCTURE: CPT

## 2022-11-25 PROCEDURE — 85027 COMPLETE CBC AUTOMATED: CPT

## 2022-11-25 PROCEDURE — 80053 COMPREHEN METABOLIC PANEL: CPT

## 2022-11-25 PROCEDURE — 83735 ASSAY OF MAGNESIUM: CPT

## 2022-11-25 PROCEDURE — 99024 POSTOP FOLLOW-UP VISIT: CPT | Performed by: INTERNAL MEDICINE

## 2022-11-25 PROCEDURE — 74011250636 HC RX REV CODE- 250/636: Performed by: INTERNAL MEDICINE

## 2022-11-25 PROCEDURE — 65270000046 HC RM TELEMETRY

## 2022-11-25 PROCEDURE — 74011000250 HC RX REV CODE- 250: Performed by: INTERNAL MEDICINE

## 2022-11-25 PROCEDURE — 74011250637 HC RX REV CODE- 250/637: Performed by: INTERNAL MEDICINE

## 2022-11-25 RX ORDER — BACITRACIN 500 UNIT/G
1 PACKET (EA) TOPICAL ONCE
Status: DISCONTINUED | OUTPATIENT
Start: 2022-11-25 | End: 2022-11-25

## 2022-11-25 RX ORDER — BACITRACIN 500 UNIT/G
1 PACKET (EA) TOPICAL 3 TIMES DAILY
Status: DISCONTINUED | OUTPATIENT
Start: 2022-11-25 | End: 2022-11-25

## 2022-11-25 RX ADMIN — CEPHALEXIN 500 MG: 500 CAPSULE ORAL at 16:30

## 2022-11-25 RX ADMIN — CEPHALEXIN 500 MG: 500 CAPSULE ORAL at 21:28

## 2022-11-25 RX ADMIN — SODIUM CHLORIDE, POTASSIUM CHLORIDE, SODIUM LACTATE AND CALCIUM CHLORIDE 100 ML/HR: 600; 310; 30; 20 INJECTION, SOLUTION INTRAVENOUS at 03:33

## 2022-11-25 RX ADMIN — SODIUM CHLORIDE, PRESERVATIVE FREE 10 ML: 5 INJECTION INTRAVENOUS at 21:27

## 2022-11-25 RX ADMIN — CEPHALEXIN 500 MG: 500 CAPSULE ORAL at 08:54

## 2022-11-25 RX ADMIN — SODIUM CHLORIDE, PRESERVATIVE FREE 10 ML: 5 INJECTION INTRAVENOUS at 14:00

## 2022-11-25 NOTE — PROGRESS NOTES
Spiritual Care Assessment/Progress Note  [facility]      NAME: Bev Mcintosh      MRN: [id number]  AGE: 65 y.o.  SEX: male  Restoration Affiliation: No preference   Language: English     11/25/2022     Total Time (in minutes): 8     Spiritual Assessment begun in [facility] through conversation with:         [x]Patient        [] Family    [] Friend(s)        Reason for Consult: Initial/Spiritual assessment, critical care     Spiritual beliefs: (Please include comment if needed)     [] Identifies with a mikey tradition:         [] Supported by a mikey community:            [] Claims no spiritual orientation:           [] Seeking spiritual identity:                [x] Adheres to an individual form of spirituality:           [] Not able to assess:                           Identified resources for coping:      [] Prayer                               [] Music                  [] Guided Imagery     [] Family/friends                 [] Pet visits     [] Devotional reading                         [x] Unknown     [] Other:                                               Interventions offered during this visit: (See comments for more details)    Patient Interventions: Affirmation of emotions/emotional suffering, Catharsis/review of pertinent events in supportive environment, Initial/Spiritual assessment, Critical care, Prayer (assurance of), Restoration beliefs/image of God discussed           Plan of Care:     [x] Support spiritual and/or cultural needs    [] Support AMD and/or advance care planning process      [] Support grieving process   [] Coordinate Rites and/or Rituals    [] Coordination with community clergy   [] No spiritual needs identified at this time   [] Detailed Plan of Care below (See Comments)  [] Make referral to Music Therapy  [] Make referral to Pet Therapy     [] Make referral to Addiction services  [] Make referral to [facility]  [] Make referral to Spiritual Care Partner  [] No future visits requested        [x] Contact Spiritual Care for further referrals     Comments: Visited Mr London Alex in [facility] for initial spiritual assessment; reviewed patient's chart prior to visit. Mr London Alex was lying quietly in bed and appeared in pretty good spirits. Provided spiritual presence and active listening as patient shared that he thought his original problem had been corrected but staff was trying to get his blood pressure regulated. Shared that his primary concern was wanting to go home. Acknowledged his feelings and offered words of support. Mr London Alex stated that he was not a part of any particular Oriental orthodox; that he tried to abide by the Ten Commandments. Said he did not feel that the bad things that happened to people was God's will. Assured Mr London Alex of prayers on his behalf and of ongoing  availability for support. : . Lorrie Perkins.  Shabnam Jon; [facility], to contact [phone] Luis Avelar call: [phone]

## 2022-11-25 NOTE — PROGRESS NOTES
TRANSFER - IN REPORT:    Verbal report received from Lawrence RN(name) on Dilcia Solis  being received from CCU(unit) for routine progression of care      Report consisted of patients Situation, Background, Assessment and   Recommendations(SBAR). Information from the following report(s) SBAR, Procedure Summary, Intake/Output, MAR, Recent Results, and Cardiac Rhythm v-paced  was reviewed with the receiving nurse. Opportunity for questions and clarification was provided. Assessment completed upon patients arrival to unit and care assumed.

## 2022-11-25 NOTE — PROGRESS NOTES
[facility] Progress note      Hx of syncope  Hx of atrophic legs, polio since 2 yo    S/p leadless pacemaker. No chest pain. Breathing ok. Removed drain yesterday after echo noted no significant pericardial effusion. Physical exam:  Visit Vitals  BP (!) 128/55 (BP 1 Location: Left lower arm, BP Patient Position: At rest)   Pulse 76   Temp 97.9 °F (36.6 °C)   Resp 20   Ht 6' 1\" (1.854 m)   Wt 201 lb 11.5 oz (91.5 kg)   SpO2 93%   BMI 26.61 kg/m²       GENERAL: No acute distress  HEAD: no trauma  HEENT: Sclerae anicteric   CARDIOVASCULAR: paced, no rubs/gallops 2/6 LSB diastolic murmur  RESPIRATORY: clear to auscultation bilaterally, without wheezes/rales/rhonchi  ABDOMINAL: soft, non-tender, mild distension  EXTREMITIES: warm atrophic legs, 1+ LE edema  NEURO: alert, oriented, answering questions appropriately need to use hands to move lower legs  PSY: normal mood  Pulses femoral weak    Data: Labs, ECG, telemetry personally reviewed and interpreted    Current active problems:   1. intermittent complete heart block and 2-1 AV block Mobitz II s/p leadless pacemaker  2. Syncope  3. 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 Echo was without pericardial effusion and removed pericardial drain yesterday. No additional cardiac recs and will sign off. Outpt follow up with Dr. Odette Garcia. Already scheduled 12/7/22 at 11 AM ([phone]). [x]    High complexity decision making was performed  [x]    Patient is at high-risk of decompensation with multiple organ involvement                                 Cardiac testing  11/21/22    ECHO ADULT COMPLETE 11/22/2022 11/22/2022    Interpretation Summary    Left Ventricle: Normal left ventricular systolic function with a visually estimated EF of 55 - 60%. Left ventricle size is normal. Normal wall thickness. Normal wall motion. Grade I diastolic dysfunction with normal LAP.     Aortic Valve: Moderately calcified cusp. Moderate sclerosis of the aortic valve cusp. Mild regurgitation. Mild stenosis of the aortic valve. Mitral Valve: Valve structure is normal. Mild annular calcification of the mitral valve. Pulmonary Arteries: Mild pulmonary hypertension present. Signed by: Cassie Casey MD on 11/22/2022  1:15 PM            ECG: second degree type 2 heart block    Review of Systems:    [x]All other systems reviewed and all negative except as written in HPI    [] Patient unable to provide secondary to condition    Past Medical History:   Diagnosis Date    Dizzy spells     Irregular heart beats      Past Surgical History:   Procedure Laterality Date    HX ORTHOPAEDIC  1959    Knee work      Social Hx:  reports that he has quit smoking. He does not have any smokeless tobacco history on file. He reports that he does not drink alcohol. Family Hx: family history includes Stroke in his mother. No Known Allergies       OBJECTIVE:  Wt Readings from Last 3 Encounters:   11/25/22 201 lb 11.5 oz (91.5 kg)   11/22/22 180 lb (81.6 kg)   01/08/16 203 lb (92.1 kg)       Intake/Output Summary (Last 24 hours) at 11/25/2022 0813  Last data filed at 11/25/2022 0800  Gross per 24 hour   Intake 2615.79 ml   Output 1825 ml   Net 790.79 ml         Physical Exam:    Vitals:   Vitals:    11/25/22 0500 11/25/22 0600 11/25/22 0700 11/25/22 0800   BP:    (!) 128/55   Pulse: 73 91 70 76   Resp: 18 22 16 20   Temp:       SpO2: 94% 96% 92% 93%   Weight:       Height:             Recent Labs     11/25/22  0434 11/24/22  0408    138   K 4.2 4.1    107   CO2 25 25   BUN 22* 29*   CREA 0.65* 0.58*   GLU 94 114*   CA 7.5* 7.4*       Recent Labs     11/25/22  0434 11/24/22  0408   WBC 8.8 11.2*   HGB 10.8* 11.6*   HCT 31.4* 33.4*   * 150       Recent Labs     11/25/22  0434   AP 60       No results for input(s): CHOL, LDLC in the last 72 hours.     No lab exists for component: TGL, HDLC,  HBA1C      Current meds:    Current Facility-Administered Medications:     PHENYLephrine (PIERO-SYNEPHRINE) 30 mg in 0.9% sodium chloride 250 mL infusion,  mcg/min, IntraVENous, TITRATE, Maria Elena Beach MD, Stopped at 11/24/22 2330    cephALEXin (KEFLEX) capsule 500 mg, 500 mg, Oral, TID, Eddie Hui MD, 500 mg at 11/24/22 2132    sodium chloride (NS) flush 5-40 mL, 5-40 mL, IntraVENous, Q8H, Maria Elena Beach MD, 10 mL at 11/24/22 2132    sodium chloride (NS) flush 5-40 mL, 5-40 mL, IntraVENous, PRN, Maria Elena Beach MD    acetaminophen (TYLENOL) tablet 650 mg, 650 mg, Oral, Q6H PRN **OR** acetaminophen (TYLENOL) suppository 650 mg, 650 mg, Rectal, Q6H PRN, Maria Elena Beach MD    polyethylene glycol (MIRALAX) packet 17 g, 17 g, Oral, DAILY PRN, Maria Elena Beach MD    ondansetron (ZOFRAN ODT) tablet 4 mg, 4 mg, Oral, Q8H PRN **OR** ondansetron (ZOFRAN) injection 4 mg, 4 mg, IntraVENous, Q6H PRN, Maria Elena Beach MD    DOPamine (INTROPIN) 800 mg in dextrose 5% 250 mL infusion, 0-20 mcg/kg/min, IntraVENous, TITRATE, Maria Elena Beach MD, Stopped at 11/24/22 7572    lactated Ringers infusion, 100 mL/hr, IntraVENous, CONTINUOUS, Maria Elena Beach MD, Last Rate: 100 mL/hr at 11/25/22 0333, 100 mL/hr at 11/25/22 [address], MD    [facility] Cardiology  Call center: (k) [phone] (j) [phone]      CC: Dimas, MD Jose

## 2022-11-25 NOTE — PROGRESS NOTES
[facility]    ICU TEAM Progress Note    Name: Carina Valadez   : 1945   MRN: [id number]   Date: 2022      Assessment and Plan:     ICU Problems:  Bradycardia   High degree heart block        Briefly, 65 M with post polio syndrome and hypertension presented to [facility]  after syncope episode and found to have Mobitz II heart block. It was noted that he takes atenolol for hypertension. He was monitored overnight. Echocardiogram revealed normal LV function. Heart block persisted into morning of  and he was transferred to [facility] for possible PPM placement. Underwent leadless pacemaker placement on , which was complicated by pericardial effusion requiring pericardiocentesis and drain placement with removal of 300-400 cc of blood. Transferred back to the ICU on dopamine and phenylephrine infusions.    -Repeat TTE yesterday did not show any pericardial effusion, therefore the pericardial drain was removed.    -Has been off phenylephrine since 11 PM.    -Transfer to medicine service for further care. POD:  Postop day 2    S/P:   Procedure(s):  INSERT PPM DUAL    Active Problem List:     Problem List  Date Reviewed: 2022            Codes Class    Heart block ICD-10-CM: I45.9  ICD-9-CM: 426.9         Symptomatic bradycardia ICD-10-CM: R00.1  ICD-9-CM: 427.89         Syncope ICD-10-CM: R55  ICD-9-CM: 780.2         H/O atrial tachycardia ICD-10-CM: Z86.79  ICD-9-CM: V12.59         HTN (hypertension) ICD-10-CM: I10  ICD-9-CM: 401.9         Polio ICD-10-CM: A80.9  ICD-9-CM: 045.90          Past Medical History:      has a past medical history of Dizzy spells and Irregular heart beats. Past Surgical History:      has a past surgical history that includes hx orthopaedic (). Home Medications:     Prior to Admission medications    Medication Sig Start Date End Date Taking? Authorizing Provider   atenoloL (TENORMIN) 50 mg tablet Take 75 mg by mouth daily.    Yes Provider, Historical       Allergies/Social/Family History:     No Known Allergies   Social History     Tobacco Use    Smoking status: Former    Smokeless tobacco: Not on file   Substance Use Topics    Alcohol use: No     Alcohol/week: 0.0 standard drinks      Family History   Problem Relation Age of Onset    Stroke Mother          Objective:   Vital Signs:  Visit Vitals  BP (!) 128/58   Pulse 79   Temp 97.6 °F (36.4 °C)   Resp 16   Ht 6' 1\" (1.854 m)   Wt 91.5 kg (201 lb 11.5 oz)   SpO2 94%   BMI 26.61 kg/m²    O2 Flow Rate (L/min): 2 l/min O2 Device: Nasal cannula Temp (24hrs), Av.9 °F (36.6 °C), Min:97.6 °F (36.4 °C), Max:98.3 °F (36.8 °C)           Intake/Output:     Intake/Output Summary (Last 24 hours) at 2022 0916  Last data filed at 2022 0800  Gross per 24 hour   Intake 2506.29 ml   Output 1825 ml   Net 681.29 ml         Physical Exam:    Gen:  NAD  Neuro:  CH II-XII intact, no focal deficits, fully oriented  Resp:  CTAB  CV:  Doyle  Abd soft  Extremities:  No edema    LABS AND  DATA: Personally reviewed  Recent Labs     22   WBC 8.8 11.2*   HGB 10.8* 11.6*   HCT 31.4* 33.4*   * 150       Recent Labs     22    138   K 4.2 4.1    107   CO2    BUN 22* 29*   CREA 0.65* 0.58*   GLU 94 114*   CA 7.5* 7.4*   MG 2.0  --        Recent Labs     22   AP 60   TP 4.4*   ALB 2.1*   GLOB 2.3       No results for input(s): INR, PTP, APTT, INREXT, INREXT in the last 72 hours. No results for input(s): PHI, PCO2I, PO2I, FIO2I in the last 72 hours. No results for input(s): CPK, CKMB, TROIQ, BNPP in the last 72 hours.     Hemodynamics:   PAP:   CO:     Wedge:   CI:     CVP:    SVR:       PVR:       Ventilator Settings:  Mode Rate Tidal Volume Pressure FiO2 PEEP                    Peak airway pressure:      Minute ventilation:          MEDS: Reviewed    Chest X-Ray:  CXR Results  (Last 48 hours)      None              ECHO: Left Ventricle: Normal left ventricular systolic function with a visually estimated EF of 55 - 60%. Left ventricle size is normal. Normal wall thickness. Normal wall motion. Grade I diastolic dysfunction with normal LAP. Aortic Valve: Moderately calcified cusp. Moderate sclerosis of the aortic valve cusp. Mild regurgitation. Mild stenosis of the aortic valve. Mitral Valve: Valve structure is normal. Mild annular calcification of the mitral valve. Pulmonary Arteries: Mild pulmonary hypertension present. SPECIAL EQUIPMENT  None    DISPOSITION  Transfer to non-ICU bed    CRITICAL CARE CONSULTANT NOTE  I had a face to face encounter with the patient, reviewed and interpreted patient data including clinical events, labs, images, vital signs, I/O's, and examined patient. I have discussed the case and the plan and management of the patient's care with the consulting services, the bedside nurses and the respiratory therapist.      NOTE OF PERSONAL INVOLVEMENT IN CARE   This patient has a high probability of imminent, clinically significant deterioration, which requires the highest level of preparedness to intervene urgently. I participated in the decision-making and personally managed or directed the management of the following life and organ supporting interventions that required my frequent assessment to treat or prevent imminent deterioration. I personally spent 40 minutes of critical care time. This is time spent at this critically ill patient's bedside actively involved in patient care as well as the coordination of care and discussions with the patient's family. This does not include any procedural time which has been billed separately.       [address] [place] [address] Physicians

## 2022-11-25 NOTE — H&P
[facility] Adult  Hospitalist Group  History and Physical    Date of Service:  11/25/2022  Primary Care Provider: Dimas, MD Jose  Source of information: Chart review    Chief Complaint: No chief complaint on file. History of Presenting Illness:   Dilcia Solis is a 65 M with a PMH of Post Polio Syndrome Functional Paralysis and HTN who presented to [facility] 11/21 after a brief (few seconds) syncopal episode without injury, while in a store and work up in [facility] ED revealed a [address]. Noted that he takes atenolol for hypertension, monitored overnight. Heart block persisted into morning of 11/22 and he was transferred to [facility] for further eval and treatment and PPM placement. Admitted to CV ICU. Transfer out 11/25/22. REVIEW OF SYSTEMS:  A comprehensive review of systems was negative except for that written in the History of Present Illness. Past Medical History:   Diagnosis Date    Dizzy spells     Irregular heart beats       Past Surgical History:   Procedure Laterality Date    HX ORTHOPAEDIC  1959    Knee work      Prior to Admission medications    Medication Sig Start Date End Date Taking? Authorizing Provider   atenoloL (TENORMIN) 50 mg tablet Take 75 mg by mouth daily. Yes Provider, Historical     No Known Allergies   Family History   Problem Relation Age of Onset    Stroke Mother       Social History:  reports that he has quit smoking. He does not have any smokeless tobacco history on file. He reports that he does not drink alcohol. Family and social history were personally reviewed, all pertinent and relevant details are outlined as above.     Objective:   Visit Vitals  /62   Pulse 84   Temp 97.6 °F (36.4 °C)   Resp 25   Ht 6' 1\" (1.854 m)   Wt 91.5 kg (201 lb 11.5 oz)   SpO2 95%   BMI 26.61 kg/m²    O2 Flow Rate (L/min): 2 l/min O2 Device: Nasal cannula    PHYSICAL EXAM:     Constitutional:  No acute distress, cooperative, pleasant    ENT:  Oral mucosa moist.    Resp:  CTA bilaterally. No wheezing/rhonchi/rales. No accessory muscle use. CV:  Regular rhythm, normal rate, S1,S2.    GI/:  Soft, non distended, non tender, no guarding, BS present. Voids Freely. Musculoskeletal:  No edema, warm. Neurologic:  BLE functional paralysis, uses bilateral canes. AAOx3. Skin:  w/d. Pacer site left chest- dressing CDI,  Psych:  Good insight, Not anxious nor agitated. Data Review: All diagnostic labs and studies have been reviewed.     Abnormal Labs Reviewed   METABOLIC PANEL, BASIC - Abnormal; Notable for the following components:       Result Value    Glucose 140 (*)     BUN 32 (*)     BUN/Creatinine ratio 31 (*)     All other components within normal limits   CBC W/O DIFF - Abnormal; Notable for the following components:    WBC 16.8 (*)     All other components within normal limits   CBC W/O DIFF - Abnormal; Notable for the following components:    WBC 17.7 (*)     All other components within normal limits   CBC W/O DIFF - Abnormal; Notable for the following components:    WBC 11.2 (*)     RBC 3.47 (*)     HGB 11.6 (*)     HCT 33.4 (*)     All other components within normal limits   METABOLIC PANEL, BASIC - Abnormal; Notable for the following components:    Glucose 114 (*)     BUN 29 (*)     Creatinine 0.58 (*)     BUN/Creatinine ratio 50 (*)     Calcium 7.4 (*)     All other components within normal limits   CBC W/O DIFF - Abnormal; Notable for the following components:    RBC 3.23 (*)     HGB 10.8 (*)     HCT 31.4 (*)     PLATELET 188 (*)     All other components within normal limits   METABOLIC PANEL, COMPREHENSIVE - Abnormal; Notable for the following components:    BUN 22 (*)     Creatinine 0.65 (*)     BUN/Creatinine ratio 34 (*)     Calcium 7.5 (*)     Protein, total 4.4 (*)     Albumin 2.1 (*)     A-G Ratio 0.9 (*)     All other components within normal limits       All Micro Results       None            IMAGING:   No orders to display ECG/ECHO:         Assessment:   Given the patient's current clinical presentation, there is a high level of concern for decompensation if discharged from the emergency department. Complex decision making was performed, which includes reviewing the patient's available past medical records, laboratory results, and imaging studies. Active Problems:    Heart block (11/22/2022)      Plan:   Syncope/Complete AV Block/HTN:  -EKG 11/22: Long QTc AV Block. Sinus rhythm with AV dissociation and Wide QRS rhythm. Inferior infarct, age undetermined. -CXR 11/21: Cardiomegaly, no focal pulmonary process. -ECHO 53/97: normal systolic function, interdeterminate/Grade 1 diastolic function. EF 60-65%. -Cardiology following: leadless AC pacer implanted 11/23, developed pericardial effusion- required emergent pericardiocentesis/ pericardial drain- removed 11/24  -keflex 500mg TID x21 doses  -BP stable, monitor  -tele     Post Polio Syndrome with atrophic legs/Paralysis: supportive care, pt's own DME. DVTppx: SCD's  Code Status: Full Code  Diet: Cardiac  Activity: uses bilateral canes  Discharge: home w/ office foll/up  Ambulates: uses bilateral canes        DIET: ADULT DIET Regular   ISOLATION PRECAUTIONS: There are currently no Active Isolations  CODE STATUS: Full Code   DVT PROPHYLAXIS: SCDs  FUNCTIONAL STATUS PRIOR TO HOSPITALIZATION: uses bilateral canes  Ambulatory status/function: uses bilateral canes  EARLY MOBILITY ASSESSMENT: Recommend a consult to the Mobility Team  ANTICIPATED DISCHARGE: 24-48 hours. ANTICIPATED DISPOSITION: Home  EMERGENCY CONTACT/SURROGATE DECISION MAKER: Bandar Pollock, sister [phone]    CRITICAL CARE WAS PERFORMED FOR THIS ENCOUNTER: NO.      Signed By: Trina Peng NP     November 25, 2022         Please note that this dictation may have been completed with Cristianie Renea, the [employer] voice recognition software.   Quite often unanticipated grammatical, syntax, homophones, and other interpretive errors are inadvertently transcribed by the computer software. Please disregard these errors. Please excuse any errors that have escaped final proofreading.

## 2022-11-25 NOTE — PROGRESS NOTES
1930: Bedside and Verbal shift change report given to Lucille Lamb RN (oncoming nurse) by 2600 Bob Avelar RN (offgoing nurse). Report included the following information SBAR, Kardex, ED Summary, OR Summary, Procedure Summary, Intake/Output, MAR, Recent Results, Cardiac Rhythm v-paced, and Alarm Parameters . 1945: Lines and gtts verified. 2330: Akash gtt weaned off.    0730: Bedside and Verbal shift change report given to 2600 Bob Avelar RN (oncoming nurse) by Lucille Lamb RN (offgoing nurse). Report included the following information SBAR, Kardex, ED Summary, OR Summary, Procedure Summary, Intake/Output, MAR, Recent Results, Cardiac Rhythm v-paced, and Alarm Parameters .

## 2022-11-25 NOTE — PROGRESS NOTES
0730: Bedside shift change report given to CANDICE Bravo (oncoming nurse) by Pan Brown RN (offgoing nurse). Report included the following information SBAR, Kardex, ED Summary, OR Summary, Procedure Summary, Intake/Output, MAR, Recent Results, Cardiac Rhythm V-Paced, and Dual Neuro Assessment. 0800: LLE swelling noted; IVF LR @ 100 stopped. 1035: RN @ the bedside to remove infiltrated PIV and R radial arterial line. 1630: TRANSFER - OUT REPORT:    Verbal report given to Denton Quesada RN(name) on Bentley Dozier  being transferred to CVSU 457(unit) for routine progression of care       Report consisted of patients Situation, Background, Assessment and   Recommendations(SBAR). Information from the following report(s) SBAR, Kardex, ED Summary, OR Summary, Procedure Summary, Intake/Output, MAR, Recent Results, Cardiac Rhythm V-Paced, and Dual Neuro Assessment was reviewed with the receiving nurse. Lines:   Peripheral IV 11/22/22 Anterior;Distal;Left Forearm (Active)   Site Assessment Clean, dry, & intact 11/25/22 1200   Phlebitis Assessment 0 11/25/22 1200   Infiltration Assessment 0 11/25/22 1200   Dressing Status Clean, dry, & intact 11/25/22 1200   Dressing Type Transparent 11/25/22 1200   Hub Color/Line Status Pink; Infusing 11/25/22 1200   Action Taken Open ports on tubing capped 11/25/22 1200   Alcohol Cap Used Yes 11/25/22 1200        Opportunity for questions and clarification was provided.       Patient transported with:   Monitor  Registered Nurse

## 2022-11-26 ENCOUNTER — APPOINTMENT (OUTPATIENT)
Dept: VASCULAR SURGERY | Age: 77
DRG: 229 | End: 2022-11-26
Attending: HOSPITALIST
Payer: MEDICARE

## 2022-11-26 VITALS
RESPIRATION RATE: 20 BRPM | WEIGHT: 201.72 LBS | SYSTOLIC BLOOD PRESSURE: 157 MMHG | HEIGHT: 73 IN | OXYGEN SATURATION: 93 % | DIASTOLIC BLOOD PRESSURE: 65 MMHG | BODY MASS INDEX: 26.73 KG/M2 | TEMPERATURE: 98.3 F | HEART RATE: 84 BPM

## 2022-11-26 LAB
ALBUMIN SERPL-MCNC: 2 G/DL (ref 3.5–5)
ALBUMIN/GLOB SERPL: 0.7 {RATIO} (ref 1.1–2.2)
ALP SERPL-CCNC: 67 U/L (ref 45–117)
ALT SERPL-CCNC: 27 U/L (ref 12–78)
ANION GAP SERPL CALC-SCNC: 5 MMOL/L (ref 5–15)
AST SERPL-CCNC: 23 U/L (ref 15–37)
BILIRUB SERPL-MCNC: 0.6 MG/DL (ref 0.2–1)
BUN SERPL-MCNC: 20 MG/DL (ref 6–20)
BUN/CREAT SERPL: 27 (ref 12–20)
CALCIUM SERPL-MCNC: 8 MG/DL (ref 8.5–10.1)
CHLORIDE SERPL-SCNC: 108 MMOL/L (ref 97–108)
CO2 SERPL-SCNC: 26 MMOL/L (ref 21–32)
CREAT SERPL-MCNC: 0.73 MG/DL (ref 0.7–1.3)
ERYTHROCYTE [DISTWIDTH] IN BLOOD BY AUTOMATED COUNT: 12.9 % (ref 11.5–14.5)
GLOBULIN SER CALC-MCNC: 3 G/DL (ref 2–4)
GLUCOSE SERPL-MCNC: 101 MG/DL (ref 65–100)
HCT VFR BLD AUTO: 34.1 % (ref 36.6–50.3)
HGB BLD-MCNC: 11.4 G/DL (ref 12.1–17)
MAGNESIUM SERPL-MCNC: 2 MG/DL (ref 1.6–2.4)
MCH RBC QN AUTO: 33.2 PG (ref 26–34)
MCHC RBC AUTO-ENTMCNC: 33.4 G/DL (ref 30–36.5)
MCV RBC AUTO: 99.4 FL (ref 80–99)
NRBC # BLD: 0 K/UL (ref 0–0.01)
NRBC BLD-RTO: 0 PER 100 WBC
PLATELET # BLD AUTO: 168 K/UL (ref 150–400)
PMV BLD AUTO: 10.4 FL (ref 8.9–12.9)
POTASSIUM SERPL-SCNC: 4.1 MMOL/L (ref 3.5–5.1)
PROT SERPL-MCNC: 5 G/DL (ref 6.4–8.2)
RBC # BLD AUTO: 3.43 M/UL (ref 4.1–5.7)
SODIUM SERPL-SCNC: 139 MMOL/L (ref 136–145)
WBC # BLD AUTO: 8.7 K/UL (ref 4.1–11.1)

## 2022-11-26 PROCEDURE — 85027 COMPLETE CBC AUTOMATED: CPT

## 2022-11-26 PROCEDURE — 74011250637 HC RX REV CODE- 250/637: Performed by: INTERNAL MEDICINE

## 2022-11-26 PROCEDURE — 83735 ASSAY OF MAGNESIUM: CPT

## 2022-11-26 PROCEDURE — 93971 EXTREMITY STUDY: CPT

## 2022-11-26 PROCEDURE — 80053 COMPREHEN METABOLIC PANEL: CPT

## 2022-11-26 PROCEDURE — 74011000250 HC RX REV CODE- 250: Performed by: INTERNAL MEDICINE

## 2022-11-26 PROCEDURE — 36415 COLL VENOUS BLD VENIPUNCTURE: CPT

## 2022-11-26 RX ORDER — CEPHALEXIN 500 MG/1
500 CAPSULE ORAL 3 TIMES DAILY
Qty: 13 CAPSULE | Refills: 0 | Status: SHIPPED | OUTPATIENT
Start: 2022-11-26 | End: 2022-11-28 | Stop reason: SDUPTHER

## 2022-11-26 RX ADMIN — SODIUM CHLORIDE, PRESERVATIVE FREE 5 ML: 5 INJECTION INTRAVENOUS at 13:54

## 2022-11-26 RX ADMIN — SODIUM CHLORIDE, PRESERVATIVE FREE 5 ML: 5 INJECTION INTRAVENOUS at 09:36

## 2022-11-26 RX ADMIN — CEPHALEXIN 500 MG: 500 CAPSULE ORAL at 15:31

## 2022-11-26 RX ADMIN — CEPHALEXIN 500 MG: 500 CAPSULE ORAL at 09:35

## 2022-11-26 NOTE — DISCHARGE SUMMARY
Discharge Summary       PATIENT ID: Mary Lovell  MRN: [id number]   YOB: 1945    DATE OF ADMISSION: 11/22/2022  1:37 PM    DATE OF DISCHARGE: 11/26/2022   PRIMARY CARE PROVIDER: Kathi Foster MD     ATTENDING PHYSICIAN: Dr Frederick Morris  DISCHARGING PROVIDER: Frederick Morris MD    To contact this individual call [phone] and ask the  to page. If unavailable ask to be transferred the Adult Hospitalist Department. CONSULTATIONS: IP CONSULT TO CARDIOLOGY    PROCEDURES/SURGERIES: Procedure(s):  INSERT OR REPLACE TRANSCATH PPM LEADLESS  PERICARDIOCENTESIS    DISCHARGE DIAGNOSES:   Syncope/Complete AV Block/HTN:  -EKG 11/22: Long QTc AV Block. Sinus rhythm with AV dissociation and Wide QRS rhythm. Inferior infarct, age undetermined. -CXR 11/21: Cardiomegaly, no focal pulmonary process. -ECHO 87/62: normal systolic function, interdeterminate/Grade 1 diastolic function. EF 60-65%. -Cardiology following: leadless AC pacer implanted 11/23, developed pericardial effusion- required emergent pericardiocentesis/ pericardial drain- removed 11/24  -keflex 500mg TID x21 doses  -BP stable, monitor    RLE swelling  -Dopplers to rule out DVT and if negative, will discharge the patient home    Hypoalbuminemia: counseled on high protein diet, he understands     Post Polio Syndrome with atrophic legs/Paralysis: supportive care, pt's own DME. ADDITIONAL CARE RECOMMENDATIONS:   Follow up with PMD  Follow up with Cardiology     NOTIFY YOUR PHYSICIAN FOR ANY OF THE FOLLOWING:   Fever over 101 degrees for 24 hours. Chest pain, shortness of breath, fever, chills, nausea, vomiting, diarrhea, change in mentation, falling, weakness, bleeding. Severe pain or pain not relieved by medications, as well as any other signs or symptoms that you may have questions about.     FOLLOW UP APPOINTMENTS:    Follow-up Information       Follow up With Specialties Details Why Contact Info    PCP  Follow up in 1 week(s) Rik Gale MD Cardiovascular Disease Physician, Clinical Cardiac Electrophysiology Physician Follow up on 12/7/2022 at 11 am [address] [id number] [facility] [phone]                 DIET: Cardiac Diet    ACTIVITY: Activity as tolerated    DISCHARGE MEDICATIONS:  Current Discharge Medication List        START taking these medications    Details   cephALEXin (KEFLEX) 500 mg capsule Take 1 Capsule by mouth three (3) times daily for 13 doses. Qty: 13 Capsule, Refills: 0  Start date: 11/26/2022, End date: 12/1/2022           CONTINUE these medications which have NOT CHANGED    Details   atenoloL (TENORMIN) 50 mg tablet Take 75 mg by mouth daily.              DISPOSITION:   x Home With:   OT  PT  HH  RN       Long term SNF/Inpatient Rehab    Independent/assisted living    Hospice    Other:       PATIENT CONDITION AT DISCHARGE:     Functional status    Poor     Deconditioned    x Independent      Cognition   x  Lucid     Forgetful     Dementia      Catheters/lines (plus indication)    Tompkins     PICC     PEG    x None      Code status    x Full code     DNR      PHYSICAL EXAMINATION AT DISCHARGE:  Please see progress note      CHRONIC MEDICAL DIAGNOSES:  Problem List as of 11/26/2022 Date Reviewed: 11/23/2022            Codes Class Noted - Resolved    Heart block ICD-10-CM: I45.9  ICD-9-CM: 426.9  11/22/2022 - Present        Symptomatic bradycardia ICD-10-CM: R00.1  ICD-9-CM: 427.89  11/21/2022 - Present        Syncope ICD-10-CM: R55  ICD-9-CM: 780.2  11/21/2022 - Present        H/O atrial tachycardia ICD-10-CM: Z86.79  ICD-9-CM: V12.59  10/31/2014 - Present        HTN (hypertension) ICD-10-CM: I10  ICD-9-CM: 401.9  10/17/2014 - Present        Polio ICD-10-CM: A80.9  ICD-9-CM: 045.90  10/17/2014 - Present           Greater than 39 minutes were spent with the patient on counseling and coordination of care    Signed:   Karen Michaels MD  11/26/2022  1:01 PM    .

## 2022-11-26 NOTE — PROGRESS NOTES
[facility] Adult  Hospitalist Group                                                                                          Hospitalist Progress Note  Frederick Morris MD  Answering service: [phone] from in house phone        Date of Service:  2022  NAME:  Mary Lovell  :  1945  MRN:  [id number]      Admission Summary:   Mary Lovell is a 65 M with a PMH of Post Polio Syndrome Functional Paralysis and HTN who presented to [facility]  after a brief (few seconds) syncopal episode without injury, while in a store and work up in [facility] ED revealed a [address]. Noted that he takes atenolol for hypertension, monitored overnight. Heart block persisted into morning of  and he was transferred to [facility] for further eval and treatment and PPM placement. Admitted to CV ICU. Transfer out 22. Interval history / Subjective: Follow up Syncope   Concerned about swelling in his right leg, no pain or redness  Good pulses  No chest pain, SOB  Assessment & Plan:     Syncope/Complete AV Block/HTN:  -EKG : Long QTc AV Block. Sinus rhythm with AV dissociation and Wide QRS rhythm. Inferior infarct, age undetermined. -CXR : Cardiomegaly, no focal pulmonary process. -ECHO 91/80: normal systolic function, interdeterminate/Grade 1 diastolic function. EF 60-65%. -Cardiology following: leadless AC pacer implanted , developed pericardial effusion- required emergent pericardiocentesis/ pericardial drain- removed   -keflex 500mg TID x21 doses  -BP stable, monitor  -Dopplers to rule out DVT and if negative, will discharge the patient home     Post Polio Syndrome with atrophic legs/Paralysis: supportive care, pt's own DME.            Code status: FULL CODE  Prophylaxis: heparin    Plan: Dopplers and if negative, discharge home  Care Plan discussed with: patient  Anticipated Disposition: home today     Hospital Problems  Date Reviewed: 2022 Codes Class Noted POA    Heart block ICD-10-CM: I45.9  ICD-9-CM: 426.9  11/22/2022 Unknown             Review of Systems:   A comprehensive review of systems was negative except for that written in the HPI. Vital Signs:    Last 24hrs VS reviewed since prior progress note. Most recent are:  Visit Vitals  /68 (BP 1 Location: Right upper arm, BP Patient Position: Semi fowlers)   Pulse 68   Temp 98.2 °F (36.8 °C)   Resp 20   Ht 6' 1\" (1.854 m)   Wt 91.5 kg (201 lb 11.5 oz)   SpO2 93%   BMI 26.61 kg/m²         Intake/Output Summary (Last 24 hours) at 11/26/2022 1247  Last data filed at 11/26/2022 4664  Gross per 24 hour   Intake --   Output 2975 ml   Net -2975 ml        Physical Examination:     I had a face to face encounter with this patient and independently examined them on 11/26/2022 as outlined below:          Constitutional:  No acute distress, cooperative, pleasant    ENT:  Oral mucosa moist, oropharynx benign. Resp:  CTA bilaterally. No wheezing/rhonchi/rales. No accessory muscle use. CV:  Regular rhythm, normal rate, no murmurs, gallops, rubs    GI:  Soft, non distended, non tender. normoactive bowel sounds, no hepatosplenomegaly     Musculoskeletal:  No edema, warm, 2+ pulses throughout    Neurologic:  Moves all extremities. AAOx3, CN II-XII reviewed            Data Review:    Review and/or order of clinical lab test      Labs:     Recent Labs     11/26/22 0257 11/25/22 0434   WBC 8.7 8.8   HGB 11.4* 10.8*   HCT 34.1* 31.4*    142*     Recent Labs     11/26/22 0257 11/25/22  0434 11/24/22  0408    139 138   K 4.1 4.2 4.1    108 107   CO2 26 25 25   BUN 20 22* 29*   CREA 0.73 0.65* 0.58*   * 94 114*   CA 8.0* 7.5* 7.4*   MG 2.0 2.0  --      Recent Labs     11/26/22 0257 11/25/22  0434   ALT 27 22   AP 67 60   TBILI 0.6 0.9   TP 5.0* 4.4*   ALB 2.0* 2.1*   GLOB 3.0 2.3     No results for input(s): INR, PTP, APTT, INREXT in the last 72 hours.    No results for input(s): FE, TIBC, PSAT, FERR in the last 72 hours. No results found for: FOL, RBCF   No results for input(s): PH, PCO2, PO2 in the last 72 hours. No results for input(s): CPK, CKNDX, TROIQ in the last 72 hours.     No lab exists for component: CPKMB  No results found for: CHOL, CHOLX, CHLST, CHOLV, HDL, HDLP, LDL, LDLC, DLDLP, TGLX, TRIGL, TRIGP, CHHD, CHHDX  No results found for: GLUCPOC  No results found for: COLOR, APPRN, SPGRU, REFSG, ARIE, PROTU, GLUCU, KETU, BILU, UROU, JOSE G, LEUKU, GLUKE, EPSU, BACTU, WBCU, RBCU, CASTS, UCRY      Medications Reviewed:     Current Facility-Administered Medications   Medication Dose Route Frequency    cephALEXin (KEFLEX) capsule 500 mg  500 mg Oral TID    sodium chloride (NS) flush 5-40 mL  5-40 mL IntraVENous Q8H    sodium chloride (NS) flush 5-40 mL  5-40 mL IntraVENous PRN    acetaminophen (TYLENOL) tablet 650 mg  650 mg Oral Q6H PRN    Or    acetaminophen (TYLENOL) suppository 650 mg  650 mg Rectal Q6H PRN    polyethylene glycol (MIRALAX) packet 17 g  17 g Oral DAILY PRN    ondansetron (ZOFRAN ODT) tablet 4 mg  4 mg Oral Q8H PRN    Or    ondansetron (ZOFRAN) injection 4 mg  4 mg IntraVENous Q6H PRN     ______________________________________________________________________  EXPECTED LENGTH OF STAY: 2d 9h  ACTUAL LENGTH OF STAY:          Janina Stevens MD

## 2022-11-26 NOTE — PROGRESS NOTES
0730: Bedside and Verbal shift change report given to CANDICE Banks (oncoming nurse) by Angeli Barnes RN (offgoing nurse). Report included the following information SBAR, Kardex, MAR, and Cardiac Rhythm NSR / SB .     1500: Per ultrasound from RLE venous duplex, pt positive to DVT from groin to above the knee, MD Spence notified. 1545: Per MD Kimberly Contreras pt will discharge on eliquis to treat DVT. Pt verbalized and acknowledged treatment plan recommended by MD Kimberly Contreras. 1651: R groin sutures removed per MD Kimberly Contreras. 1847: Discharge instructions, medication education, and follow up appointments given to patient. Pt verbalized understanding of all discharge instructions. Pt discharged with all belongings including braces, crutches, pacemaker box and card, and hard prescription copy. Care Plan:   Problem: Pressure Injury - Risk of  Goal: *Prevention of pressure injury  Description: Document Binh Scale and appropriate interventions in the flowsheet.   Outcome: Progressing Towards Goal  Note: Pressure Injury Interventions:  Moisture Interventions: Absorbent underpads, Apply protective barrier, creams and emollients, Assess need for specialty bed, Check for incontinence Q2 hours and as needed, Contain wound drainage, Internal/External urinary devices, Maintain skin hydration (lotion/cream), Minimize layers, Moisture barrier    Activity Interventions: PT/OT evaluation, Pressure redistribution bed/mattress(bed type), Increase time out of bed    Mobility Interventions: PT/OT evaluation, Pressure redistribution bed/mattress (bed type), HOB 30 degrees or less    Nutrition Interventions: Document food/fluid/supplement intake    Friction and Shear Interventions: Apply protective barrier, creams and emollients, Feet elevated on foot rest, Foam dressings/transparent film/skin sealants, HOB 30 degrees or less, Lift sheet, Lift team/patient mobility team, Minimize layers      Problem: Patient Education: Go to Patient Education Activity  Goal: Patient/Family Education  Outcome: Progressing Towards Goal       Problem: Falls - Risk of  Goal: *Absence of Falls  Description: Document Dennie Oak Fall Risk and appropriate interventions in the flowsheet.   Outcome: Progressing Towards Goal  Note: Fall Risk Interventions:  Mobility Interventions: Bed/chair exit alarm, Communicate number of staff needed for ambulation/transfer, Patient to call before getting OOB    Medication Interventions: Teach patient to arise slowly, Patient to call before getting OOB, Bed/chair exit alarm    Elimination Interventions: Bed/chair exit alarm, Call light in reach, Urinal in reach    History of Falls Interventions: Bed/chair exit alarm       Problem: Patient Education: Go to Patient Education Activity  Goal: Patient/Family Education  Outcome: Progressing Towards Goal       Problem: Patient Education: Go to Patient Education Activity  Goal: Patient/Family Education  Outcome: Progressing Towards Goal       Problem: Pacer/ICD: Discharge Outcomes  Goal: *Hemodynamically stable  Outcome: Progressing Towards Goal  Goal: *Stable cardiac rhythm  Outcome: Progressing Towards Goal  Goal: *Lungs clear or at baseline  Outcome: Progressing Towards Goal  Goal: *Identifies cardiac risk factors  Outcome: Progressing Towards Goal  Goal: *No signs and symptoms of infection or wound complications  Outcome: Progressing Towards Goal  Goal: *Anxiety reduced or absent  Outcome: Progressing Towards Goal       Problem: Discharge Planning  Goal: *Discharge to safe environment  Outcome: Progressing Towards Goal  Goal: *Knowledge of medication management  Outcome: Progressing Towards Goal  Goal: *Knowledge of discharge instructions  Outcome: Progressing Towards Goal       Problem: Patient Education: Go to Patient Education Activity  Goal: Patient/Family Education  Outcome: Progressing Towards Goal       Problem: Arrhythmia Pathway (Adult)  Goal: *Absence of arrhythmia  Outcome: Progressing Towards Goal       Problem: Patient Education: Go to Patient Education Activity  Goal: Patient/Family Education  Outcome: Progressing Towards Goal

## 2022-11-26 NOTE — PROGRESS NOTES
Transition of Care Plan    RUR: 12%    - Disposition, Home, Independent  - 2nd IMM signed 11/26  - PCP F/U  -[facility]    Medicare pt has received, reviewed, and signed 2nd IM letter informing them of their right to appeal the discharge. Signed copied has been placed on pt bedside chart. Patient in agreement to discharge despite receiving IMM less than 4 hours to discharge.     Maciej Dick, CHEPE, LAURYM-SW  Case Management [address] [place] [address]  C: [phone]

## 2022-11-28 ENCOUNTER — TELEPHONE (OUTPATIENT)
Dept: FAMILY MEDICINE CLINIC | Age: 77
End: 2022-11-28

## 2022-11-28 RX ORDER — CEPHALEXIN 500 MG/1
500 CAPSULE ORAL 3 TIMES DAILY
Qty: 13 CAPSULE | Refills: 0 | Status: SHIPPED | OUTPATIENT
Start: 2022-11-28 | End: 2022-12-03

## 2022-11-28 NOTE — TELEPHONE ENCOUNTER
Reason for call: Pt calling wrong office-- however, he is calling and wanting to let you or your office know that he had 2 medications called into his pharmacy. He had Cephalexin called in and Eliquis. He received the Eliquis 5mg, however, they seem to not be able to fill his Cephalexin 500mg. He is wondering if this can be sent to his pharmacy and they were supposed to be ordered together on the same day. He said he has called around and has no idea who to contact. I let him know I can send a message to the prescribing provider, and to try and contact [facility] again or the pharmacy to see what's going on with his medication. I apologized to him for what he was experiencing, and he is saying his leg is now swollen and has doubled in size. He is requesting a call back as soon as possible with any information on if this can be filled to his pharmacy. cephALEXin (KEFLEX) 500 mg capsule [[id number]]     Dose: 500 mg Route: Oral Frequency: 3 TIMES DAILY   Dispense Quantity: 13 Capsule Refills: 0          Sig: Take 1 Capsule by mouth three (3) times daily for 13 doses.          Start Date: 11/26/22 End Date: 12/01/22 after 13 doses   Written Date: 11/26/22 Expiration Date: --   Providers    Ordering and Authorizing Provider:    Sandoval Zheng, [address] [place] [address]   Phone:  [phone]   Fax:  [phone]   MARY #:  [id number]   NPI:  [id number]        Ordering User:  Sandoval Zheng MD        Is this a new problem: yes     Date of last appointment:  Visit date not found     Can we respond via e[employer]pl[employer]: no    Best call back number: [phone]

## 2022-12-01 ENCOUNTER — TELEPHONE (OUTPATIENT)
Dept: CARDIOLOGY CLINIC | Age: 77
End: 2022-12-01

## 2022-12-01 RX ORDER — FUROSEMIDE 20 MG/1
20 TABLET ORAL
Qty: 30 TABLET | Refills: 5 | Status: SHIPPED | OUTPATIENT
Start: 2022-12-01

## 2022-12-01 NOTE — TELEPHONE ENCOUNTER
Verified patient with two types of identifiers. Notified patient of NP recommendations. Patient lives much closer to [facility] office. Rescheduled patient's device check to [facility] office. Patient to update our office next week if symptoms do not improve. Patient verbalized understanding and will call with any other questions.       Future Appointments   Date Time Provider William Ren   12/8/2022  9:00 AM PACEMAKERTEGAN   2/27/2023  3:30 PM REMOTEKRISTINA Vizcaino AMB

## 2022-12-01 NOTE — TELEPHONE ENCOUNTER
It may take several days before he begins to note a difference in swelling after starting Eliquis. It can take up to 3-6 months for the blood clot to resolve, which is why we use [address] [place] [address] for that duration. To help with the swelling in the next 1-2 weeks, he can use furosemide 20 mg po daily. Once swelling has improved, he can use it prn. Regarding activity, try to avoid anything significantly strenuous until leg swelling & discomfort has improved. He has somewhat limited mobility at baseline due to prior polio infection, but shouldn't be totally sedentary. Mild to moderate activity is fine.

## 2022-12-01 NOTE — TELEPHONE ENCOUNTER
Verified patient with two types of identifiers. Patient is s/p AV micra on 11/23/22. Positive for DVT on 11/26/22 and discharged on 11/26/2. Started on Eliquis 10 mg BID for 7 days & 5 mg BID thereafter. He states his leg is still very swollen. Notified that it will take some time for the clot to dissolve. Patient requesting other guidelines of things he can and cannot do. He had Polio in 1947 so has to wear a brace. This is making it very difficult for him to wear his brace. Scheduled for follow up on 12/7/22. He is unsure he will be able to come if still significantly swollen and painful. Will ask MD/NP for recommendations.      Future Appointments   Date Time Provider William Ren   12/7/2022 11:00 AM PACEMAKER3, KRISTINA LANDA   12/7/2022 11:20 AM WOUND CHECKS, KRISTINA LANDA   2/27/2023  3:30 PM REMOTE1, KRISTINA OWUSU AMB

## 2022-12-08 ENCOUNTER — CLINICAL SUPPORT (OUTPATIENT)
Dept: CARDIOLOGY CLINIC | Age: 77
End: 2022-12-08
Payer: MEDICARE

## 2022-12-08 DIAGNOSIS — Z95.0 CARDIAC PACEMAKER IN SITU: Primary | ICD-10-CM

## 2023-03-01 ENCOUNTER — OFFICE VISIT (OUTPATIENT)
Dept: CARDIOLOGY CLINIC | Age: 78
End: 2023-03-01

## 2023-03-01 ENCOUNTER — OFFICE VISIT (OUTPATIENT)
Dept: CARDIOLOGY CLINIC | Age: 78
End: 2023-03-01
Payer: MEDICARE

## 2023-03-01 VITALS
WEIGHT: 188 LBS | DIASTOLIC BLOOD PRESSURE: 84 MMHG | OXYGEN SATURATION: 96 % | RESPIRATION RATE: 20 BRPM | SYSTOLIC BLOOD PRESSURE: 134 MMHG | HEART RATE: 76 BPM | BODY MASS INDEX: 24.92 KG/M2 | HEIGHT: 73 IN

## 2023-03-01 DIAGNOSIS — Z95.0 PRESENCE OF PERMANENT CARDIAC PACEMAKER: Primary | ICD-10-CM

## 2023-03-01 DIAGNOSIS — Z79.01 ANTICOAGULATED: ICD-10-CM

## 2023-03-01 DIAGNOSIS — I82.411 ACUTE DEEP VEIN THROMBOSIS (DVT) OF FEMORAL VEIN OF RIGHT LOWER EXTREMITY (HCC): ICD-10-CM

## 2023-03-01 DIAGNOSIS — I35.0 AORTIC VALVE STENOSIS, ETIOLOGY OF CARDIAC VALVE DISEASE UNSPECIFIED: ICD-10-CM

## 2023-03-01 DIAGNOSIS — Z95.0 CARDIAC PACEMAKER IN SITU: Primary | ICD-10-CM

## 2023-03-01 DIAGNOSIS — Z86.79 H/O ATRIAL TACHYCARDIA: ICD-10-CM

## 2023-03-01 DIAGNOSIS — I45.9 HEART BLOCK: ICD-10-CM

## 2023-03-01 PROCEDURE — 93288 INTERROG EVL PM/LDLS PM IP: CPT | Performed by: INTERNAL MEDICINE

## 2023-03-01 NOTE — PROGRESS NOTES
[facility] CARDIOLOGY   Electrophysiology Note       Patient Name: Dina Morales - :1945 - LBJ:[id number]       HPI:       Dina Morales is a 65 y.o.. male with PMH significant for HTN and post-polio syndrome. He had AV block. Now s/p MICRA 22. 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 He had symptomatic syncope with bradycardia. He has second degree av block Mobitz II with bradycardia 30 bpm, then third degree av block. He cannot avoid pushing with arms to get from wheel chair to bed due to poliomyelitis paralysis so he was concerned about lead dislodgement with dual chamber pacemaker. He insisted on getting leadless pacemaker. He had been on dopamine and his rate was still 30 bpm.  He has weak femoral pulses and his legs are small, atrophic due to chronic paralysis and lack of use. He was high risk for complication but there is no other alternative    Post procedure he required pericardiocentesis/pericardial drain. He also developed DVT later and was started on Eliquis. RLE edema improved. Hx of atrophic legs, polio since 2 yo     He feels well overall and denies chest pain, dyspnea or dizziness. He continues to have some RLE edema. Assessment and Plan       ICD-10-CM ICD-9-CM    1. Presence of permanent cardiac pacemaker  Z95.0 V45.01 DUPLEX LOWER EXT VENOUS RIGHT      ECHO ADULT COMPLETE      2. Heart block  I45.9 426.9 DUPLEX LOWER EXT VENOUS RIGHT      ECHO ADULT COMPLETE      3. Acute deep vein thrombosis (DVT) of femoral vein of right lower extremity (HCC)  I82.411 453.41 DUPLEX LOWER EXT VENOUS RIGHT      ECHO ADULT COMPLETE      4. Anticoagulated  Z79.01 V58.61 DUPLEX LOWER EXT VENOUS RIGHT      ECHO ADULT COMPLETE      5. Aortic valve stenosis, etiology of cardiac valve disease unspecified  I35.0 424.1 ECHO ADULT COMPLETE      6. H/O atrial tachycardia  Z86.79 V12.59           Second degree type 2 av block.    Echo 22: EF 55-60%, moderate aortic sclerosis with mild AI. S/p MICRA 11/23/22. Normal device function. 8 years left on battery. Pacing 93% total  I explained risk of cardiomyopathy over time    Post procedure DVT. - Continue Eliquis 5 mg BID.   - Repeat LE duplex to assess for resolution of DVT prior to stopping anticoagulation. He prefers to come off eliquis    Hypertension.   - Controlled on atenolol. Post-polio syndrome.   - LE weakness. He uses canes to walk.     ____________________________________________________________     Cardiac testing   11/21/22     ECHO ADULT COMPLETE 11/22/2022 11/22/2022     Interpretation Summary   ·  Left Ventricle: Normal left ventricular systolic function with a visually estimated EF of 55 - 60%. Left ventricle size is normal. Normal wall thickness. Normal wall motion. Grade I diastolic dysfunction with normal LAP. ·  Aortic Valve: Moderately calcified cusp. Moderate sclerosis of the aortic valve cusp. Mild regurgitation. Mild stenosis of the aortic valve. ·  Mitral Valve: Valve structure is normal. Mild annular calcification of the mitral valve. ·  Pulmonary Arteries: Mild pulmonary hypertension present. Signed by: Joseph Holman MD on 11/22/2022  1:15 PM         Review of Systems:     [x]All other systems reviewed and all negative except as written in HPI     [ ] Patient unable to provide secondary to condition     Past Medical History:   Diagnosis Date   · Dizzy spells   · Irregular heart beats     Past Surgical History:   Procedure Laterality Date   · HX ORTHOPAEDIC 1959   Knee work     Social Hx:  reports that he has quit smoking. He does not have any smokeless tobacco history on file. He reports that he does not drink alcohol. Family Hx: family history includes Stroke in his mother.    No Known Allergies        OBJECTIVE:   Wt Readings from Last 3 Encounters:   03/01/23 85.3 kg (188 lb)   11/25/22 91.5 kg (201 lb 11.5 oz)   11/22/22 81.6 kg (180 lb) Intake/Output Summary (Last 24 hours) at 11/23/2022 1020   Last data filed at 11/23/2022 0800   Gross per 24 hour   Intake 1471.83 ml   Output 1700 ml   Net -228.17 ml         Physical Exam:   Visit Vitals  /84 (BP 1 Location: Left upper arm, BP Patient Position: Sitting, BP Cuff Size: Adult)   Pulse 76   Resp 20   Ht 6' 1\" (1.854 m)   Wt 188 lb (85.3 kg)   SpO2 96%   BMI 24.80 kg/m²     Gen: No acute distress. Neck: Supple, No JVD, No Carotid Bruit   Resp: No accessory muscle use, Clear breath sounds, No rales or rhonchi   Card: Regular rate and rhythm, 2/6 RUSB systolic murmur   Abd:   Soft, non-tender, non-distended   MSK: Alonzo LE weakness and deformity 2/2 polio. Alonzo UE without abnormalities. Skin: No rashes     Neuro: Alert, oriented. Psych: Good insight, oriented to person, place, alert, Nml Affect   LE: Alonzo trace pretibial edema he has prosthesis        Current Outpatient Medications on File Prior to Visit   Medication Sig Dispense Refill    furosemide (LASIX) 20 mg tablet Take 1 Tablet by mouth daily as needed for PRN Reason (Other) (swelling). 30 Tablet 5    atenoloL (TENORMIN) 50 mg tablet Take 75 mg by mouth daily. No current facility-administered medications on file prior to visit. Josephine Owens M.D.    Electrophysiology/Cardiology   [facility] Cardiology   [address] [address] [address]                                [phone]

## 2023-03-01 NOTE — PROGRESS NOTES
Room #: 5    Occasional palpitations and dizziness. He has been taking his Lasix everyday instead of PRN. Has questions about his atenolol dose. Chief Complaint   Patient presents with    Follow-up     3 mo post implant    Pacemaker Check    Syncope    Other     CHB       Visit Vitals  /84 (BP 1 Location: Left upper arm, BP Patient Position: Sitting, BP Cuff Size: Adult)   Pulse 76   Resp 20   Ht 6' 1\" (1.854 m)   Wt 188 lb (85.3 kg)   SpO2 96%   BMI 24.80 kg/m²         Chest pain:  NO  Shortness of breath:  NO  Edema: NO  Palpitations, skipped beats, rapid heartbeat:  YES  Dizziness:  YES    1. Have you been to the ER, urgent care clinic since your last visit? Hospitalized since your last visit? NO    2. Have you seen or consulted any other health care providers outside of the [address] [place] [address] since your last visit? Include any pap smears or colon screening.  NO      Refills:  NO

## 2023-04-06 ENCOUNTER — ANCILLARY PROCEDURE (OUTPATIENT)
Dept: CARDIOLOGY CLINIC | Age: 78
End: 2023-04-06

## 2023-05-17 RX ORDER — FUROSEMIDE 20 MG/1
20 TABLET ORAL DAILY PRN
COMMUNITY
Start: 2022-12-01

## 2023-05-17 RX ORDER — ATENOLOL 50 MG/1
75 TABLET ORAL DAILY
COMMUNITY

## 2023-12-07 ENCOUNTER — OFFICE VISIT (OUTPATIENT)
Age: 78
End: 2023-12-07
Payer: MEDICARE

## 2023-12-07 VITALS
DIASTOLIC BLOOD PRESSURE: 72 MMHG | OXYGEN SATURATION: 98 % | HEART RATE: 84 BPM | WEIGHT: 190 LBS | SYSTOLIC BLOOD PRESSURE: 130 MMHG | HEIGHT: 73 IN | BODY MASS INDEX: 25.18 KG/M2

## 2023-12-07 DIAGNOSIS — I10 PRIMARY HYPERTENSION: ICD-10-CM

## 2023-12-07 DIAGNOSIS — Z79.01 LONG TERM (CURRENT) USE OF ANTICOAGULANTS: ICD-10-CM

## 2023-12-07 DIAGNOSIS — I82.411 ACUTE EMBOLISM AND THROMBOSIS OF RIGHT FEMORAL VEIN (HCC): ICD-10-CM

## 2023-12-07 DIAGNOSIS — I35.0 NONRHEUMATIC AORTIC (VALVE) STENOSIS: ICD-10-CM

## 2023-12-07 DIAGNOSIS — Z95.0 PRESENCE OF CARDIAC PACEMAKER: ICD-10-CM

## 2023-12-07 DIAGNOSIS — Z86.718 HISTORY OF DVT (DEEP VEIN THROMBOSIS): Primary | ICD-10-CM

## 2023-12-07 PROCEDURE — 93005 ELECTROCARDIOGRAM TRACING: CPT | Performed by: INTERNAL MEDICINE

## 2023-12-07 PROCEDURE — 99214 OFFICE O/P EST MOD 30 MIN: CPT | Performed by: INTERNAL MEDICINE

## 2023-12-07 RX ORDER — TAMSULOSIN HYDROCHLORIDE 0.4 MG/1
0.4 CAPSULE ORAL DAILY
COMMUNITY
Start: 2023-11-28

## 2023-12-07 NOTE — PROGRESS NOTES
Keith Marrero MD., [facility]    [address] [facility] Darc[place] HEREDIA    Office [phone],NWD [phone]           Shayy Sandoval is here for a f/u office visit. Primary care physician:  GORDON Carroll    CC - as documented in EMR    Dear MsDelmar GORDON Carroll    I had the pleasure of seeing Ms. Shayy Sandoval in the office today. Assessment:      [facility] admn 11/2023 - Syncope - high grade AV block (CHB Type 2 second degree). Transferred to [facility] for pacemaker insertion. PPM by Dr Estefani Felix- S/p MICRA ([address])  11/23/22. Post procedure DVT - on Eliquis     HTN:  hold home meds       LE paralysis secondary to polio. Plan:      Venous ultrasound bilateral-has chronic right femoral vein thrombus. BP controlled    Has had bloodwork done at [facility] recently - Hb and kidney function OK per  patient    Patient feels very tired after taking the Eliquis dose. By evening he is feeling better and when he takes the evening dose of the Eliquis he again feels tired. Will switch him to Xarelto to see if it was a medication effect which I doubt. If there is no change in his symptoms, it is not probably Eliquis related. Follow-up 3 months/earlier as needed    Patient understands the plan. All questions were answered to the patient's satisfaction. I appreciate the opportunity to be involved in [facility]. See note below for details. Please do not hesitate to contact us with questions or concerns. Dia Maguire MD      Cardiac Testing/ Procedures: A. Cardiac Cath/PCI:    B.ECHO/CHAVO:    C.StressNuclear/Stress ECHO/Stress test:    D.Vascular: 4/26/23  Likely chronic occlusive deep venous thrombosis within the right common femoral vein. Post thrombotic changes are noted within the proximal right femoral and profunda femoris arteries.   The right great saphenous vein exhibits partially occlusive superficial vein thrombus at the level of the saphenofemoral

## 2024-02-02 ENCOUNTER — TELEPHONE (OUTPATIENT)
Age: 79
End: 2024-02-02

## 2024-02-02 NOTE — TELEPHONE ENCOUNTER
Pt. Had a VAS DUP scheduled back in December, asking if he needs to R/S the Appt. Or not.     Pt. Phone - [phone]

## 2024-03-05 ENCOUNTER — ANCILLARY PROCEDURE (OUTPATIENT)
Age: 79
End: 2024-03-05
Payer: MEDICARE

## 2024-03-05 DIAGNOSIS — I82.411 ACUTE EMBOLISM AND THROMBOSIS OF RIGHT FEMORAL VEIN (HCC): ICD-10-CM

## 2024-03-05 DIAGNOSIS — I35.0 NONRHEUMATIC AORTIC (VALVE) STENOSIS: ICD-10-CM

## 2024-03-05 DIAGNOSIS — Z79.01 LONG TERM (CURRENT) USE OF ANTICOAGULANTS: ICD-10-CM

## 2024-03-05 DIAGNOSIS — I10 PRIMARY HYPERTENSION: ICD-10-CM

## 2024-03-05 DIAGNOSIS — Z95.0 PRESENCE OF CARDIAC PACEMAKER: ICD-10-CM

## 2024-03-05 PROCEDURE — 93970 EXTREMITY STUDY: CPT | Performed by: INTERNAL MEDICINE

## 2024-03-14 ENCOUNTER — OFFICE VISIT (OUTPATIENT)
Age: 79
End: 2024-03-14
Payer: MEDICARE

## 2024-03-14 VITALS
OXYGEN SATURATION: 95 % | SYSTOLIC BLOOD PRESSURE: 134 MMHG | BODY MASS INDEX: 25.71 KG/M2 | WEIGHT: 194 LBS | HEART RATE: 79 BPM | DIASTOLIC BLOOD PRESSURE: 78 MMHG | RESPIRATION RATE: 18 BRPM | HEIGHT: 73 IN

## 2024-03-14 DIAGNOSIS — Z95.0 PRESENCE OF CARDIAC PACEMAKER: ICD-10-CM

## 2024-03-14 DIAGNOSIS — Z79.01 LONG TERM (CURRENT) USE OF ANTICOAGULANTS: Primary | ICD-10-CM

## 2024-03-14 DIAGNOSIS — I35.0 NONRHEUMATIC AORTIC (VALVE) STENOSIS: ICD-10-CM

## 2024-03-14 DIAGNOSIS — R00.1 SYMPTOMATIC BRADYCARDIA: ICD-10-CM

## 2024-03-14 DIAGNOSIS — I10 PRIMARY HYPERTENSION: ICD-10-CM

## 2024-03-14 PROCEDURE — 3078F DIAST BP <80 MM HG: CPT | Performed by: INTERNAL MEDICINE

## 2024-03-14 PROCEDURE — 99214 OFFICE O/P EST MOD 30 MIN: CPT | Performed by: INTERNAL MEDICINE

## 2024-03-14 PROCEDURE — 1123F ACP DISCUSS/DSCN MKR DOCD: CPT | Performed by: INTERNAL MEDICINE

## 2024-03-14 PROCEDURE — 3075F SYST BP GE 130 - 139MM HG: CPT | Performed by: INTERNAL MEDICINE

## 2024-03-14 RX ORDER — FINASTERIDE 5 MG/1
5 TABLET, FILM COATED ORAL DAILY
COMMUNITY

## 2024-03-14 NOTE — PROGRESS NOTES
Chief Complaint   Patient presents with    Hypertension    Follow-up    Other     PPM       C/O decrease energy later in the day    Chest Pain  No    Last Lipids Last year at [facility]      /78 (Site: Left Upper Arm, Position: Sitting, Cuff Size: Medium Adult)   Pulse 79   Resp 18   Ht 1.854 m (6' 1\")   Wt 88 kg (194 lb)   SpO2 95%   BMI 25.60 kg/m²       Have you been to the ER, urgent care clinic since your last visit? No  Hospitalized since your last visit? NO    2. Have you seen or consulted with any other health care providers outside of the [facility] since your last visit?  No    
Miscellaneous:    History:     Alexander Hall is a 78 y.o. male who returns for follow up visit.    No CP/dyspnea/swelling LE/palpitaitons    ROS:  (bold if positive, if negative)           Medications:       Current Outpatient Medications   Medication Instructions    atenolol (TENORMIN) 75 mg, Oral, DAILY    finasteride (PROSCAR) 5 mg, Oral, DAILY    rivaroxaban (XARELTO) 20 mg, Oral, DAILY WITH BREAKFAST    tamsulosin (FLOMAX) 0.4 mg, Oral, DAILY            Family History of CAD:    No    Social History:  Current  Smoker  No    Physical Exam:     /78 (Site: Left Upper Arm, Position: Sitting, Cuff Size: Medium Adult)   Pulse 79   Resp 18   Ht 1.854 m (6' 1\")   Wt 88 kg (194 lb)   SpO2 95%   BMI 25.60 kg/m²        Gen: Well-developed, well-nourished, in no acute distress  Neck: Supple,No JVD, No Carotid Bruit,   Resp: No accessory muscle use, Clear breath sounds, No rales or rhonchi  Card: Regular Rate,Rythm,Normal S1, S2, No murmurs, rubs or gallop. No thrills.   Abd:  Soft, BS+,   MSK: No cyanosis  Skin: No rashes    Neuro: moving all four extremities , follows commands appropriately  Psych:  Good insight, oriented to person, place , alert, Nml Affect  LE: Right lower extremity-brace    EKG:       Medication Side Effects and Warnings were discussed with patient: yes  Patient Labs were reviewed and/or requested:  yes  Patient Past Records were reviewed and/or requested: yes    Total time :        mins    ATTENTION:   This medical record was transcribed using an electronic medical records/speech recognition system.  Although proofread, it may and can contain electronic, spelling and other errors.  Corrections may be executed at a later time.  Please feel free to contact us for any clarifications as needed.      Franko Reid MD

## 2024-03-16 NOTE — PROGRESS NOTES
[facility] Cardiology  Cardiac Electrophysiology Clinic Care Note                  []Initial visit     [x]Established visit     Patient Name: Alexander Hall - :1945 - MRN:[id number]  Primary Cardiologist: Franko Reid MD  Electrophysiologist: Boone Guardado MD     Reason for visit: Leadless pacemaker follow up    HPI:  Mr. Hall is a 78 y.o. male who presents for follow up, is s/p Medtronic leadless pacemaker (DOI 2022).    He reports that he's slowly progressing in his recovery; this was prolonged due to chronic DVT post procedure & underlying musculoskeletal issues related to prior polio.    Echo in 2022 showed LVEF 60-65% with mild concentric LVH.  Repeat echo is pending today; preliminary report indicates LVEF 50-55%.    Lower extremity venous duplex in 2024 showed chronic DVT in the right common femoral vein; post thrombotic changes noted within the proximal femoral vein.    BP controlled.    Anticoagulated with Xarelto, denies bleeding issues.      Previous:  Switched from Eliquis to Xarelto due to fatigue.    Medtronic leadless pacemaker (DOI 2022) implanted for 2nd & 3rd degree AVB.  Required pericardiocentesis post procedure, developed DVT & started Eliquis.    Paralyzed below the waist secondary to poliomyelitis in childhood.       Assessment and Plan       ICD-10-CM    1. History of placement of leadless cardiac pacemaker  Z95.0       2. AV block, 3rd degree (HCC)  I44.2       3. 2nd degree AV block  I44.1       4. Chronic deep vein thrombosis (DVT) of proximal vein of right lower extremity (HCC)  I82.5Y1       5. Anticoagulated  Z79.01           Medtronic leadless pacemaker (DOI 2022): Implanted for 2nd & 3rd degree AVB.  Device check today shows proper lead & generator function.  Generator longevity estimated 8 years.  RV 96.04% with LRL 50 bpm.    At risk for pacer induced CM with high RV pacing burden.  Repeat echo pending today; preliminary

## 2024-03-21 ENCOUNTER — ANCILLARY PROCEDURE (OUTPATIENT)
Age: 79
End: 2024-03-21
Payer: MEDICARE

## 2024-03-21 ENCOUNTER — OFFICE VISIT (OUTPATIENT)
Age: 79
End: 2024-03-21
Payer: MEDICARE

## 2024-03-21 VITALS
BODY MASS INDEX: 25.84 KG/M2 | WEIGHT: 195 LBS | DIASTOLIC BLOOD PRESSURE: 78 MMHG | HEART RATE: 80 BPM | SYSTOLIC BLOOD PRESSURE: 128 MMHG | HEIGHT: 73 IN

## 2024-03-21 VITALS
HEIGHT: 73 IN | OXYGEN SATURATION: 95 % | SYSTOLIC BLOOD PRESSURE: 128 MMHG | HEART RATE: 76 BPM | DIASTOLIC BLOOD PRESSURE: 78 MMHG | BODY MASS INDEX: 25.86 KG/M2 | RESPIRATION RATE: 20 BRPM | WEIGHT: 195.11 LBS

## 2024-03-21 DIAGNOSIS — I10 HTN (HYPERTENSION): ICD-10-CM

## 2024-03-21 DIAGNOSIS — Z95.0 HISTORY OF PLACEMENT OF LEADLESS CARDIAC PACEMAKER: Primary | ICD-10-CM

## 2024-03-21 DIAGNOSIS — I44.1 2ND DEGREE AV BLOCK: ICD-10-CM

## 2024-03-21 DIAGNOSIS — Z79.01 ANTICOAGULATED: ICD-10-CM

## 2024-03-21 DIAGNOSIS — I82.5Y1 CHRONIC DEEP VEIN THROMBOSIS (DVT) OF PROXIMAL VEIN OF RIGHT LOWER EXTREMITY (HCC): ICD-10-CM

## 2024-03-21 DIAGNOSIS — I44.2 AV BLOCK, 3RD DEGREE (HCC): ICD-10-CM

## 2024-03-21 PROCEDURE — 1123F ACP DISCUSS/DSCN MKR DOCD: CPT | Performed by: NURSE PRACTITIONER

## 2024-03-21 PROCEDURE — 3078F DIAST BP <80 MM HG: CPT | Performed by: NURSE PRACTITIONER

## 2024-03-21 PROCEDURE — 93306 TTE W/DOPPLER COMPLETE: CPT | Performed by: INTERNAL MEDICINE

## 2024-03-21 PROCEDURE — 99214 OFFICE O/P EST MOD 30 MIN: CPT | Performed by: NURSE PRACTITIONER

## 2024-03-21 PROCEDURE — 3074F SYST BP LT 130 MM HG: CPT | Performed by: NURSE PRACTITIONER

## 2024-03-21 NOTE — PROGRESS NOTES
Room #: 4    Chief Complaint   Patient presents with    Annual Exam       Vitals:    03/21/24 1005   BP: 128/78   Pulse: 76   Resp: 20   SpO2: 95%   Weight: 88.5 kg (195 lb 1.7 oz)   Height: 1.854 m (6' 1\")         Chest pain:  NO    Have you been to the ER, urgent care, or hospitalized outside of [facility] since your last visit?   NO    Refills:  NO

## 2024-03-27 LAB
ECHO AO ROOT DIAM: 3.8 CM
ECHO AO ROOT INDEX: 1.78 CM/M2
ECHO AV AREA PEAK VELOCITY: 1.9 CM2
ECHO AV AREA VTI: 1.6 CM2
ECHO AV AREA/BSA PEAK VELOCITY: 0.9 CM2/M2
ECHO AV AREA/BSA VTI: 0.8 CM2/M2
ECHO AV MEAN GRADIENT: 6 MMHG
ECHO AV MEAN VELOCITY: 1.2 M/S
ECHO AV PEAK GRADIENT: 10 MMHG
ECHO AV PEAK VELOCITY: 1.6 M/S
ECHO AV VELOCITY RATIO: 0.56
ECHO AV VTI: 28 CM
ECHO BSA: 2.13 M2
ECHO LA DIAMETER INDEX: 1.97 CM/M2
ECHO LA DIAMETER: 4.2 CM
ECHO LA TO AORTIC ROOT RATIO: 1.11
ECHO LA VOL A-L A2C: 54 ML (ref 18–58)
ECHO LA VOL A-L A4C: 54 ML (ref 18–58)
ECHO LA VOL BP: 54 ML (ref 18–58)
ECHO LA VOL MOD A2C: 53 ML (ref 18–58)
ECHO LA VOL MOD A4C: 50 ML (ref 18–58)
ECHO LA VOL/BSA BIPLANE: 25 ML/M2 (ref 16–34)
ECHO LA VOLUME AREA LENGTH: 57 ML
ECHO LA VOLUME INDEX A-L A2C: 25 ML/M2 (ref 16–34)
ECHO LA VOLUME INDEX A-L A4C: 25 ML/M2 (ref 16–34)
ECHO LA VOLUME INDEX AREA LENGTH: 27 ML/M2 (ref 16–34)
ECHO LA VOLUME INDEX MOD A2C: 25 ML/M2 (ref 16–34)
ECHO LA VOLUME INDEX MOD A4C: 23 ML/M2 (ref 16–34)
ECHO LV EDV A4C: 89 ML
ECHO LV EDV INDEX A4C: 42 ML/M2
ECHO LV EJECTION FRACTION A4C: 52 %
ECHO LV ESV A4C: 43 ML
ECHO LV ESV INDEX A4C: 20 ML/M2
ECHO LV FRACTIONAL SHORTENING: 31 % (ref 28–44)
ECHO LV INTERNAL DIMENSION DIASTOLE INDEX: 2.39 CM/M2
ECHO LV INTERNAL DIMENSION DIASTOLIC: 5.1 CM (ref 4.2–5.9)
ECHO LV INTERNAL DIMENSION SYSTOLIC INDEX: 1.64 CM/M2
ECHO LV INTERNAL DIMENSION SYSTOLIC: 3.5 CM
ECHO LV IVSD: 0.9 CM (ref 0.6–1)
ECHO LV MASS 2D: 163.6 G (ref 88–224)
ECHO LV MASS INDEX 2D: 76.8 G/M2 (ref 49–115)
ECHO LV POSTERIOR WALL DIASTOLIC: 0.9 CM (ref 0.6–1)
ECHO LV RELATIVE WALL THICKNESS RATIO: 0.35
ECHO LVOT AREA: 3.5 CM2
ECHO LVOT AV VTI INDEX: 0.48
ECHO LVOT DIAM: 2.1 CM
ECHO LVOT MEAN GRADIENT: 2 MMHG
ECHO LVOT PEAK GRADIENT: 3 MMHG
ECHO LVOT PEAK VELOCITY: 0.9 M/S
ECHO LVOT STROKE VOLUME INDEX: 21.6 ML/M2
ECHO LVOT SV: 46 ML
ECHO LVOT VTI: 13.3 CM
ECHO RV INTERNAL DIMENSION: 4 CM
ECHO RV TAPSE: 1.6 CM (ref 1.7–?)

## 2024-03-28 ENCOUNTER — TELEPHONE (OUTPATIENT)
Age: 79
End: 2024-03-28

## 2024-07-21 PROCEDURE — 93294 REM INTERROG EVL PM/LDLS PM: CPT | Performed by: INTERNAL MEDICINE

## 2024-08-25 NOTE — PROGRESS NOTES
Franko Reid MD., [facility]    [address],[facility],[place] [address]    Office [phone],Fax [phone]           Alexander Hall is here for a f/u office visit.    Primary care physician:  Marco Abraham PA    CC - as documented in EMR    Dear Ms. Marco Abraham PA    I had the pleasure of seeing Ms..Everett Hall in the office today.      Assessment:     Hx of AV block   [facility] admn 11/2023 - Syncope - high grade AV block (CHB Type 2 second degree). Transferred to [facility] for pacemaker insertion.  PPM by Dr Guardado- S/p MICRA (leadless)  11/23/22.  Post procedure DVT - on Xarelto     HTN:        LE paralysis secondary to polio.     Fatigue      Plan:        BP controlled. Dec Atenolol 50mg 1.5 tab to 1 tab daily    [facility]    Device check 7/2024    (Though his symptoms of fatigue/tiredness has not completely resolved after changing from Eliquis to Xarelto, he does feel slightly better.  Will continue Xarelto. ) Also, the DVT was in his right leg (polio affected leg) and there is higher risk of repeat clot formation.  Will continue Xarelto for now.    Follow-up6 months/earlier as needed    Patient understands the plan. All questions were answered to the patient's satisfaction.    I appreciate the opportunity to be involved in . See note below for details. Please do not hesitate to contact us with questions or concerns.    Franko Reid MD      Cardiac Testing/ Procedures:       A.Cardiac Cath/PCI:    B.ECHO/CHAVO: 3/21/24    Left Ventricle: Low normal left ventricular systolic function with a visually estimated EF of 50 - 55%. Left ventricle size is normal. Normal wall thickness. Normal wall motion.    Aortic Valve: Trileaflet valve. Mild sclerosis of the aortic valve cusp.    Mitral Valve: Mild annular calcification of the mitral valve. Mild regurgitation.    Tricuspid Valve: Trace regurgitation.    C.StressNuclear/Stress ECHO/Stress

## 2024-08-29 ENCOUNTER — OFFICE VISIT (OUTPATIENT)
Age: 79
End: 2024-08-29
Payer: MEDICARE

## 2024-08-29 VITALS
BODY MASS INDEX: 24.65 KG/M2 | DIASTOLIC BLOOD PRESSURE: 60 MMHG | HEART RATE: 81 BPM | OXYGEN SATURATION: 96 % | WEIGHT: 186 LBS | HEIGHT: 73 IN | SYSTOLIC BLOOD PRESSURE: 110 MMHG

## 2024-08-29 DIAGNOSIS — Z95.0 PRESENCE OF PERMANENT CARDIAC PACEMAKER: ICD-10-CM

## 2024-08-29 DIAGNOSIS — A80.9 POLIO: ICD-10-CM

## 2024-08-29 DIAGNOSIS — I82.5Y1 CHRONIC DEEP VEIN THROMBOSIS (DVT) OF PROXIMAL VEIN OF RIGHT LOWER EXTREMITY (HCC): ICD-10-CM

## 2024-08-29 DIAGNOSIS — I45.9 HEART BLOCK: ICD-10-CM

## 2024-08-29 DIAGNOSIS — I10 PRIMARY HYPERTENSION: Primary | ICD-10-CM

## 2024-08-29 PROCEDURE — 99214 OFFICE O/P EST MOD 30 MIN: CPT | Performed by: INTERNAL MEDICINE

## 2024-08-29 PROCEDURE — 3074F SYST BP LT 130 MM HG: CPT | Performed by: INTERNAL MEDICINE

## 2024-08-29 PROCEDURE — 3078F DIAST BP <80 MM HG: CPT | Performed by: INTERNAL MEDICINE

## 2024-08-29 PROCEDURE — 1123F ACP DISCUSS/DSCN MKR DOCD: CPT | Performed by: INTERNAL MEDICINE

## 2024-08-29 RX ORDER — ATENOLOL 50 MG/1
50 TABLET ORAL DAILY
Qty: 90 TABLET | Refills: 3
Start: 2024-08-29

## 2024-08-29 NOTE — PROGRESS NOTES
Chief Complaint   Patient presents with    Hypertension    Other     HEART BLOCK, PACEMAKER     Vitals:    08/29/24 0834   BP: 110/60   Site: Left Upper Arm   Position: Standing   Cuff Size: Medium Adult   Pulse: 81   SpO2: 96%   Weight: 84.4 kg (186 lb)   Height: 1.854 m (6' 1\")      /60 (Site: Left Upper Arm, Position: Standing, Cuff Size: Medium Adult)   Pulse 81   Ht 1.854 m (6' 1\")   Wt 84.4 kg (186 lb)   SpO2 96%   BMI 24.54 kg/m²

## 2024-08-30 ENCOUNTER — CLINICAL DOCUMENTATION (OUTPATIENT)
Age: 79
End: 2024-08-30

## 2024-08-30 NOTE — PROGRESS NOTES
Lipid Panel  3/29/24  [facility]    Total Cholesterol 142  TG 83  VLDL 16  LDL 75  HDL 51

## 2024-11-05 ENCOUNTER — TELEPHONE (OUTPATIENT)
Age: 79
End: 2024-11-05

## 2024-11-05 NOTE — TELEPHONE ENCOUNTER
Called patient regarding request from [facility] regarding medication management of Xarelto for upcoming procedure.  Patient stated he will likely not have procedure done and he will notify [facility].  Will fax plan to [facility] if patient reconsiders.  Per Dr Reid, patient will stop Xarelto 3 days prior to procedure and bridge with Lovenox.  Hold day of procedure.  Restart Xarelto post procedure when safe to do so.

## 2025-02-20 ENCOUNTER — TELEPHONE (OUTPATIENT)
Age: 80
End: 2025-02-20

## 2025-02-20 NOTE — TELEPHONE ENCOUNTER
Patient got a bill for $127.20 for 2/8/25 regarding dr melgoza      Guarantor Name Alexander Hall  Guarantor Number [id number]  Amount Due $127.20    Patient said he didn't go to hospital or anything and he will speak with billing however can anyone possibly clarify what happened with this possibly?

## 2025-02-26 NOTE — TELEPHONE ENCOUNTER
Spoke with patient. Verified patient using two identifiers. Patient stated that he received a bill that does not align with a DOS. Sent COT ticket to billing team. Advised patient I would follow up after a response is received.

## 2025-03-14 ENCOUNTER — OFFICE VISIT (OUTPATIENT)
Age: 80
End: 2025-03-14
Payer: MEDICARE

## 2025-03-14 VITALS
BODY MASS INDEX: 25.73 KG/M2 | HEART RATE: 62 BPM | OXYGEN SATURATION: 94 % | DIASTOLIC BLOOD PRESSURE: 82 MMHG | WEIGHT: 190 LBS | HEIGHT: 72 IN | SYSTOLIC BLOOD PRESSURE: 124 MMHG

## 2025-03-14 DIAGNOSIS — I10 HYPERTENSION, UNSPECIFIED TYPE: ICD-10-CM

## 2025-03-14 DIAGNOSIS — I10 PRIMARY HYPERTENSION: Primary | ICD-10-CM

## 2025-03-14 DIAGNOSIS — I45.9 HEART BLOCK: ICD-10-CM

## 2025-03-14 PROCEDURE — 1160F RVW MEDS BY RX/DR IN RCRD: CPT | Performed by: INTERNAL MEDICINE

## 2025-03-14 PROCEDURE — 99214 OFFICE O/P EST MOD 30 MIN: CPT | Performed by: INTERNAL MEDICINE

## 2025-03-14 PROCEDURE — 3074F SYST BP LT 130 MM HG: CPT | Performed by: INTERNAL MEDICINE

## 2025-03-14 PROCEDURE — 1123F ACP DISCUSS/DSCN MKR DOCD: CPT | Performed by: INTERNAL MEDICINE

## 2025-03-14 PROCEDURE — 3079F DIAST BP 80-89 MM HG: CPT | Performed by: INTERNAL MEDICINE

## 2025-03-14 PROCEDURE — 1126F AMNT PAIN NOTED NONE PRSNT: CPT | Performed by: INTERNAL MEDICINE

## 2025-03-14 PROCEDURE — 93005 ELECTROCARDIOGRAM TRACING: CPT | Performed by: INTERNAL MEDICINE

## 2025-03-14 PROCEDURE — 93010 ELECTROCARDIOGRAM REPORT: CPT | Performed by: INTERNAL MEDICINE

## 2025-03-14 PROCEDURE — 1159F MED LIST DOCD IN RCRD: CPT | Performed by: INTERNAL MEDICINE

## 2025-03-14 NOTE — PROGRESS NOTES
Chief Complaint   Patient presents with    Hypertension    Fatigue    Other     HX AV BLOCK.     Vitals:    03/14/25 1103   BP: 124/82   BP Site: Left Upper Arm   Patient Position: Sitting   Pulse: 62   SpO2: 94%   Weight: 86.2 kg (190 lb)   Height: 1.829 m (6')         Chest pain: DENIED     Recent hospital stays: DENIED     Refills: DENIED   
electronic medical records/speech recognition system.  Although proofread, it may and can contain electronic, spelling and other errors.  Corrections may be executed at a later time.  Please feel free to contact us for any clarifications as needed.      Franko Reid MD

## 2025-03-19 DIAGNOSIS — I10 PRIMARY HYPERTENSION: ICD-10-CM

## 2025-03-19 DIAGNOSIS — Z79.01 LONG TERM (CURRENT) USE OF ANTICOAGULANTS: Primary | ICD-10-CM

## 2025-03-19 DIAGNOSIS — Z95.0 PRESENCE OF CARDIAC PACEMAKER: ICD-10-CM

## 2025-03-19 DIAGNOSIS — R00.1 SYMPTOMATIC BRADYCARDIA: ICD-10-CM

## 2025-03-19 DIAGNOSIS — I35.0 NONRHEUMATIC AORTIC (VALVE) STENOSIS: ICD-10-CM

## 2025-03-19 NOTE — PROGRESS NOTES
Requested Prescriptions     Signed Prescriptions Disp Refills    rivaroxaban (XARELTO) 20 MG TABS tablet 90 tablet 3     Sig: Take 1 tablet by mouth daily (with breakfast)     Verbal order for refill per Dr Ried.

## 2025-03-26 ENCOUNTER — OFFICE VISIT (OUTPATIENT)
Age: 80
End: 2025-03-26
Payer: MEDICARE

## 2025-03-26 VITALS
HEIGHT: 73 IN | HEART RATE: 73 BPM | DIASTOLIC BLOOD PRESSURE: 70 MMHG | SYSTOLIC BLOOD PRESSURE: 130 MMHG | WEIGHT: 190 LBS | OXYGEN SATURATION: 97 % | BODY MASS INDEX: 25.18 KG/M2

## 2025-03-26 DIAGNOSIS — I44.2 AV BLOCK, 3RD DEGREE (HCC): ICD-10-CM

## 2025-03-26 DIAGNOSIS — Z79.01 ANTICOAGULATED: ICD-10-CM

## 2025-03-26 DIAGNOSIS — I10 PRIMARY HYPERTENSION: ICD-10-CM

## 2025-03-26 DIAGNOSIS — Z95.0 PRESENCE OF PERMANENT CARDIAC PACEMAKER: Primary | ICD-10-CM

## 2025-03-26 DIAGNOSIS — I35.0 NONRHEUMATIC AORTIC (VALVE) STENOSIS: ICD-10-CM

## 2025-03-26 PROCEDURE — 1123F ACP DISCUSS/DSCN MKR DOCD: CPT | Performed by: INTERNAL MEDICINE

## 2025-03-26 PROCEDURE — 3075F SYST BP GE 130 - 139MM HG: CPT | Performed by: INTERNAL MEDICINE

## 2025-03-26 PROCEDURE — 99214 OFFICE O/P EST MOD 30 MIN: CPT | Performed by: INTERNAL MEDICINE

## 2025-03-26 PROCEDURE — 1160F RVW MEDS BY RX/DR IN RCRD: CPT | Performed by: INTERNAL MEDICINE

## 2025-03-26 PROCEDURE — 1159F MED LIST DOCD IN RCRD: CPT | Performed by: INTERNAL MEDICINE

## 2025-03-26 PROCEDURE — 3078F DIAST BP <80 MM HG: CPT | Performed by: INTERNAL MEDICINE

## 2025-03-26 NOTE — PROGRESS NOTES
Chief Complaint   Patient presents with    New Patient    Annual Exam     Vitals:    03/26/25 0817   BP: 130/70   BP Site: Left Upper Arm   Patient Position: Sitting   BP Cuff Size: Medium Adult   Pulse: 73   SpO2: 97%   Weight: 86.2 kg (190 lb)   Height: 1.854 m (6' 1\")      /70 (BP Site: Left Upper Arm, Patient Position: Sitting, BP Cuff Size: Medium Adult)   Pulse 73   Ht 1.854 m (6' 1\")   Wt 86.2 kg (190 lb)   SpO2 97%   BMI 25.07 kg/m²        
bradycardia    Syncope    Heart block    Presence of permanent cardiac pacemaker    Acute deep vein thrombosis (DVT) of femoral vein of right lower extremity (HCC)       Past Medical History:   Diagnosis Date    Dizzy spells     Irregular heart beats        Past Surgical History:   Procedure Laterality Date    ORTHOPEDIC SURGERY  1959    Knee work        No Known Allergies    Social History     Tobacco Use    Smoking status: Former   Substance Use Topics    Alcohol use: No     Alcohol/week: 0.0 standard drinks of alcohol       Family History   Problem Relation Age of Onset    Stroke Mother            OBJECTIVE:    Physical Exam     Vitals:    03/26/25 0817   BP: 130/70   BP Site: Left Upper Arm   Patient Position: Sitting   BP Cuff Size: Medium Adult   Pulse: 73   SpO2: 97%   Weight: 86.2 kg (190 lb)   Height: 1.854 m (6' 1\")       General:    Alert, cooperative, no distress, appears stated age.   Neck:   Supple, no JVD.   Back:     Symmetric.   Lungs:     Clear to auscultation bilaterally.   Heart::    Regular rate and rhythm.  2/6 systolic murmur, no click, rub or gallop.   Abdomen:     Soft, non-tender.    MSK:   BLE weakness/deformity secondary to polio.   Vasc/lymph:   No lower extremity edema.   Skin:   Skin color normal. No rashes or lesions on visible areas.   Neurologic:   Alert, moves all extremities.        Data Review:     Radiology:   XR Results (most recent):    CT Result (most recent):  No results found for this or any previous visit from the past 3650 days.    MRI Result (most recent):  No results found for this or any previous visit from the past 3650 days.         Current meds:  Current Outpatient Medications   Medication Sig Dispense Refill    rivaroxaban (XARELTO) 20 MG TABS tablet Take 1 tablet by mouth daily (with breakfast) 90 tablet 3    atenolol (TENORMIN) 50 MG tablet Take 1 tablet by mouth daily (Patient taking differently: Take 1.5 tablets by mouth daily) 90 tablet 3    tamsulosin

## 2025-04-14 ENCOUNTER — ANCILLARY PROCEDURE (OUTPATIENT)
Age: 80
End: 2025-04-14
Payer: MEDICARE

## 2025-04-14 VITALS
HEART RATE: 82 BPM | WEIGHT: 190 LBS | BODY MASS INDEX: 25.18 KG/M2 | HEIGHT: 73 IN | SYSTOLIC BLOOD PRESSURE: 120 MMHG | DIASTOLIC BLOOD PRESSURE: 76 MMHG

## 2025-04-14 DIAGNOSIS — I10 PRIMARY HYPERTENSION: ICD-10-CM

## 2025-04-14 DIAGNOSIS — Z95.0 PRESENCE OF PERMANENT CARDIAC PACEMAKER: ICD-10-CM

## 2025-04-14 DIAGNOSIS — Z79.01 ANTICOAGULATED: ICD-10-CM

## 2025-04-14 DIAGNOSIS — I44.2 AV BLOCK, 3RD DEGREE (HCC): ICD-10-CM

## 2025-04-14 DIAGNOSIS — I35.0 NONRHEUMATIC AORTIC (VALVE) STENOSIS: ICD-10-CM

## 2025-04-14 PROCEDURE — C8929 TTE W OR WO FOL WCON,DOPPLER: HCPCS | Performed by: INTERNAL MEDICINE

## 2025-04-14 RX ADMIN — PERFLUTREN 10 ML: 6.52 INJECTION, SUSPENSION INTRAVENOUS at 08:54

## 2025-04-17 ENCOUNTER — RESULTS FOLLOW-UP (OUTPATIENT)
Age: 80
End: 2025-04-17

## 2025-04-17 LAB
ECHO AO ASC DIAM: 3.8 CM
ECHO AO ASCENDING AORTA INDEX: 1.8 CM/M2
ECHO AO ROOT DIAM: 3.7 CM
ECHO AO ROOT INDEX: 1.75 CM/M2
ECHO AV AREA PEAK VELOCITY: 1.2 CM2
ECHO AV AREA VTI: 1.5 CM2
ECHO AV AREA/BSA PEAK VELOCITY: 0.6 CM2/M2
ECHO AV AREA/BSA VTI: 0.7 CM2/M2
ECHO AV MEAN GRADIENT: 8 MMHG
ECHO AV MEAN VELOCITY: 1.3 M/S
ECHO AV PEAK GRADIENT: 14 MMHG
ECHO AV PEAK VELOCITY: 1.9 M/S
ECHO AV VELOCITY RATIO: 0.37
ECHO AV VTI: 31.6 CM
ECHO BSA: 2.11 M2
ECHO EST RA PRESSURE: 3 MMHG
ECHO LA DIAMETER INDEX: 2.23 CM/M2
ECHO LA DIAMETER: 4.7 CM
ECHO LA TO AORTIC ROOT RATIO: 1.27
ECHO LA VOL A-L A2C: 75 ML (ref 18–58)
ECHO LA VOL A-L A4C: 65 ML (ref 18–58)
ECHO LA VOL BP: 68 ML (ref 18–58)
ECHO LA VOL MOD A2C: 72 ML (ref 18–58)
ECHO LA VOL MOD A4C: 63 ML (ref 18–58)
ECHO LA VOL/BSA BIPLANE: 32 ML/M2 (ref 16–34)
ECHO LA VOLUME AREA LENGTH: 71 ML
ECHO LA VOLUME INDEX A-L A2C: 36 ML/M2 (ref 16–34)
ECHO LA VOLUME INDEX A-L A4C: 31 ML/M2 (ref 16–34)
ECHO LA VOLUME INDEX AREA LENGTH: 34 ML/M2 (ref 16–34)
ECHO LA VOLUME INDEX MOD A2C: 34 ML/M2 (ref 16–34)
ECHO LA VOLUME INDEX MOD A4C: 30 ML/M2 (ref 16–34)
ECHO LV E' LATERAL VELOCITY: 3.04 CM/S
ECHO LV E' SEPTAL VELOCITY: 5.61 CM/S
ECHO LV EF PHYSICIAN: 45 %
ECHO LV FRACTIONAL SHORTENING: 29 % (ref 28–44)
ECHO LV INTERNAL DIMENSION DIASTOLE INDEX: 2.32 CM/M2
ECHO LV INTERNAL DIMENSION DIASTOLIC: 4.9 CM (ref 4.2–5.9)
ECHO LV INTERNAL DIMENSION SYSTOLIC INDEX: 1.66 CM/M2
ECHO LV INTERNAL DIMENSION SYSTOLIC: 3.5 CM
ECHO LV IVSD: 0.9 CM (ref 0.6–1)
ECHO LV MASS 2D: 141.9 G (ref 88–224)
ECHO LV MASS INDEX 2D: 67.3 G/M2 (ref 49–115)
ECHO LV POSTERIOR WALL DIASTOLIC: 0.8 CM (ref 0.6–1)
ECHO LV RELATIVE WALL THICKNESS RATIO: 0.33
ECHO LVOT AREA: 3.1 CM2
ECHO LVOT AV VTI INDEX: 0.5
ECHO LVOT DIAM: 2 CM
ECHO LVOT MEAN GRADIENT: 1 MMHG
ECHO LVOT PEAK GRADIENT: 2 MMHG
ECHO LVOT PEAK VELOCITY: 0.7 M/S
ECHO LVOT STROKE VOLUME INDEX: 23.4 ML/M2
ECHO LVOT SV: 49.3 ML
ECHO LVOT VTI: 15.7 CM
ECHO MV AREA VTI: 2.6 CM2
ECHO MV E VELOCITY: 1.14 M/S
ECHO MV E/E' LATERAL: 37.5
ECHO MV E/E' RATIO (AVERAGED): 28.91
ECHO MV E/E' SEPTAL: 20.32
ECHO MV LVOT VTI INDEX: 1.19
ECHO MV MAX VELOCITY: 0.9 M/S
ECHO MV MEAN GRADIENT: 2 MMHG
ECHO MV MEAN VELOCITY: 0.6 M/S
ECHO MV PEAK GRADIENT: 4 MMHG
ECHO MV VTI: 18.7 CM
ECHO RA AREA 4C: 23.4 CM2
ECHO RA END SYSTOLIC VOLUME APICAL 4 CHAMBER INDEX BSA: 34 ML/M2
ECHO RA VOLUME: 72 ML
ECHO RIGHT VENTRICULAR SYSTOLIC PRESSURE (RVSP): 26 MMHG
ECHO RV FREE WALL PEAK S': 8.1 CM/S
ECHO RV INTERNAL DIMENSION: 4.5 CM
ECHO RV TAPSE: 1.7 CM (ref 1.7–?)
ECHO TV REGURGITANT MAX VELOCITY: 2.41 M/S
ECHO TV REGURGITANT PEAK GRADIENT: 23 MMHG

## 2025-04-17 NOTE — TELEPHONE ENCOUNTER
Verified patient with two types of identifiers. Notified of results and MD recommendations. Patient verbalized understanding and will call with any other questions.      Future Appointments   Date Time Provider Department Center   3/6/2026  8:40 AM Franko Reid MD [facility]   3/11/2026  8:20 AM Boone Guardado MD [facility]

## 2025-08-09 PROCEDURE — 93294 REM INTERROG EVL PM/LDLS PM: CPT | Performed by: INTERNAL MEDICINE

## (undated) DEVICE — FASCIAL DILATOR SET: Brand: COOK

## (undated) DEVICE — MEDI-TRACE CADENCE ADULT, DEFIBRILLATION ELECTRODE -RTS  (10 PR/PK) - PHYSIO-CONTROL: Brand: MEDI-TRACE CADENCE

## (undated) DEVICE — ANGIOGRAPHIC CATHETER: Brand: IMPULSE™

## (undated) DEVICE — AMPLATZ EXTRA STIFF WIRE GUIDE: Brand: AMPLATZ

## (undated) DEVICE — CATHETER DEL PACEMKR 7 FRX105 CM PTFE MICRA AV

## (undated) DEVICE — PERCLOSE PROGLIDE™ SUTURE-MEDIATED CLOSURE SYSTEM: Brand: PERCLOSE PROGLIDE™

## (undated) DEVICE — GUIDEWIRE VASC L180CM DIA0.035IN TIP L7CM PTFE S STL STR

## (undated) DEVICE — KIT PERICARDCENT DIA8.3FR STRAIGHT CATH + CLP LIDO TY FLD ASPIR

## (undated) DEVICE — RADIFOCUS GLIDEWIRE: Brand: GLIDEWIRE

## (undated) DEVICE — PROVE COVER: Brand: UNBRANDED

## (undated) DEVICE — BIPOLAR PACING CATHETER
Type: IMPLANTABLE DEVICE | Status: NON-FUNCTIONAL
Brand: PACEL™
Removed: 2022-11-23

## (undated) DEVICE — 6 FOOT DISPOSABLE EXTENSION CABLE WITH SAFE CONNECT / SCREW-DOWN

## (undated) DEVICE — PINNACLE INTRODUCER SHEATH: Brand: PINNACLE

## (undated) DEVICE — MICROPUNCTURE INTRODUCER SET SILHOUETTE TRANSITIONLESS WITH STAINLESS STEEL WIRE GUIDE: Brand: MICROPUNCTURE

## (undated) DEVICE — SYSTEM CLOSURE 6-12 FR VEN VASC VASCADE MVP

## (undated) DEVICE — GUIDEWIRE VASC L80CM DIA0.018IN TIP L5CM 15DEG ANG NIT

## (undated) DEVICE — NEEDLE ANGIO 18GAX7CM SECURELOC

## (undated) DEVICE — INTRO SHTH CATH 23F 55.7CM --